# Patient Record
Sex: MALE | Race: WHITE | NOT HISPANIC OR LATINO | Employment: FULL TIME | URBAN - METROPOLITAN AREA
[De-identification: names, ages, dates, MRNs, and addresses within clinical notes are randomized per-mention and may not be internally consistent; named-entity substitution may affect disease eponyms.]

---

## 2018-01-31 ENCOUNTER — VIRTUAL VISIT (OUTPATIENT)
Dept: FAMILY MEDICINE | Facility: OTHER | Age: 58
End: 2018-01-31

## 2018-01-31 NOTE — PROGRESS NOTES
"Date:   Clinician: Ulises Kemp  Clinician NPI: [id number]  Patient: Marvin rojas  Patient : 1960  Patient Address: [address], [place] [address]  Patient Phone: [phone]  Visit Protocol: Eye conditions  Patient Summary:  Marvin is a 57 year old (: 1960 ) male who initiated a Visit for conjunctivitis.  When asked the question \"Please sign me up to receive news, health information and promotions from [employer].\", Marvin responded \"Yes\".    Images of his eye condition were uploaded.   His symptoms started 1-2 days ago and affect the left eye. The symptoms consist of itchy eye(s), eye redness, eyelid swelling, eye pain, and drainage coming from the eye(s).   Symptom details     Drainage: The color of the drainage coming out of his eye(s) is clear. The drainage is watery but does not cause his eyelids to be stuck shut in the morning.    Itchiness: Marvin does not have seasonal allergies or hay fever.    Eye pain: He is experiencing mild eye pain. He has not taken over-the-counter medication for the pain.     Denied symptoms include bumps on the eyelid and light sensitivity. Marvin has not experienced a decrease in vision and does not have subconjunctival hemorrhage. He does not feel feverish.    Marvin has not had a recent diagnosis of conjunctivitis. He also has not had a recent eye injury, foreign body in the eye(s), eye surgery, and cold or ear infection. It is not known if Marvin has recently been exposed to someone with a red eye or an eye infection. He does not wear contact lenses. Marvin has not ever been diagnosed with glaucoma and denies risk factors for glaucoma (not of  descent and no family history of glaucoma ).   Marvin is not taking medication to treat his current symptoms.   Marvin does not require proof of evaluation of his eye condition before returning to school, work, or .   Marvin does not smoke or use smokeless tobacco.   " MEDICATIONS:  No current medications  , ALLERGIES:  NKDA   Clinician Response:  Dear Marvin,  Based on the information provided, you most likely have bacterial conjunctivitis, more commonly called pink eye.  I am prescribing:  Polymyxin B-trimethoprim (Polytrim) 10,000 units-1mg 1ml ophthalmic solution. Apply 1 drop into the affected eye(s) every 3 hours, up to 6 times a day for 7 days.  The medication I prescribed is an antibiotic medication. Infections can be caused by either bacteria or a virus, and often have similar symptoms, so it is possible that this is a viral infection. Antibiotics are only effective against bacterial infections, so when it is caused by a virus, the medication will not help symptoms improve or make it less contagious.  To reduce the spread of the eye infection, be sure to wash your hands at least once per hour and avoid touching the eyes as much as possible.  The following will reduce the risk for future eye infections:     Frequent handwashing    Replace towels and washcloths daily    Do not share towels and washcloths with others     Glaucoma is a condition that involves increased pressure inside the eye most often seen in people who are over 50 years of age. Glaucoma usually does not have any noticeable symptoms until permanent vision loss has occurred. Fortunately, a simple test during an eye exam can detect glaucoma even before you have symptoms.  Because you are over the age of 50, I recommend you have an eye exam every 1-2 years to screen for this and other eye conditions even if you do not wear glasses.  Follow up with your provider if you are taking your medication as directed and do not see any improvements after 2 days. Be seen earlier if you develop any new or worsening symptoms.   Diagnosis: Bacterial conjunctivitis  Diagnosis ICD: H10.9  Prescription: polymyxin B/trimethoprim (Polytrim) 10,000 units-1mg 1ml ophthalmic solution 10 ml, 7 days supply. Apply 1 drop into the  affected eye(s) every 3 hours up to 6 times a day for 7 days. Refills: 0, Refill as needed: no, Allow substitutions: yes  Pharmacy: [id number] - [phone] - [address] [place] [address]

## 2018-10-24 ENCOUNTER — OFFICE VISIT (OUTPATIENT)
Dept: FAMILY MEDICINE | Facility: CLINIC | Age: 58
End: 2018-10-24
Payer: COMMERCIAL

## 2018-10-24 VITALS
TEMPERATURE: 97.2 F | HEART RATE: 80 BPM | RESPIRATION RATE: 16 BRPM | SYSTOLIC BLOOD PRESSURE: 134 MMHG | DIASTOLIC BLOOD PRESSURE: 82 MMHG | HEIGHT: 70 IN | BODY MASS INDEX: 32.87 KG/M2 | WEIGHT: 229.6 LBS

## 2018-10-24 DIAGNOSIS — R63.4 LOSS OF WEIGHT: ICD-10-CM

## 2018-10-24 DIAGNOSIS — E11.65 UNCONTROLLED TYPE 2 DIABETES MELLITUS WITH HYPERGLYCEMIA (H): Primary | ICD-10-CM

## 2018-10-24 DIAGNOSIS — R73.09 ELEVATED GLUCOSE: ICD-10-CM

## 2018-10-24 LAB
ALBUMIN SERPL-MCNC: 3.5 G/DL (ref 3.4–5)
ALP SERPL-CCNC: 47 U/L (ref 40–150)
ALT SERPL W P-5'-P-CCNC: 26 U/L (ref 0–70)
ANION GAP SERPL CALCULATED.3IONS-SCNC: 12 MMOL/L (ref 3–14)
AST SERPL W P-5'-P-CCNC: 14 U/L (ref 0–45)
BILIRUB SERPL-MCNC: 0.6 MG/DL (ref 0.2–1.3)
BUN SERPL-MCNC: 15 MG/DL (ref 7–30)
CALCIUM SERPL-MCNC: 9.1 MG/DL (ref 8.5–10.1)
CHLORIDE SERPL-SCNC: 98 MMOL/L (ref 94–109)
CO2 SERPL-SCNC: 22 MMOL/L (ref 20–32)
CREAT SERPL-MCNC: 0.63 MG/DL (ref 0.66–1.25)
ERYTHROCYTE [DISTWIDTH] IN BLOOD BY AUTOMATED COUNT: 12.1 % (ref 10–15)
GFR SERPL CREATININE-BSD FRML MDRD: >90 ML/MIN/1.7M2
GLUCOSE SERPL-MCNC: 551 MG/DL (ref 70–99)
HBA1C MFR BLD: >15 % (ref 0–5.6)
HCT VFR BLD AUTO: 45 % (ref 40–53)
HGB BLD-MCNC: 15.8 G/DL (ref 13.3–17.7)
MCH RBC QN AUTO: 29.5 PG (ref 26.5–33)
MCHC RBC AUTO-ENTMCNC: 35.1 G/DL (ref 31.5–36.5)
MCV RBC AUTO: 84 FL (ref 78–100)
PLATELET # BLD AUTO: 213 10E9/L (ref 150–450)
POTASSIUM SERPL-SCNC: 4.4 MMOL/L (ref 3.4–5.3)
PROT SERPL-MCNC: 7.4 G/DL (ref 6.8–8.8)
RBC # BLD AUTO: 5.36 10E12/L (ref 4.4–5.9)
SODIUM SERPL-SCNC: 132 MMOL/L (ref 133–144)
TSH SERPL DL<=0.005 MIU/L-ACNC: 1.15 MU/L (ref 0.4–4)
WBC # BLD AUTO: 7.2 10E9/L (ref 4–11)

## 2018-10-24 PROCEDURE — 99214 OFFICE O/P EST MOD 30 MIN: CPT | Performed by: FAMILY MEDICINE

## 2018-10-24 PROCEDURE — 84443 ASSAY THYROID STIM HORMONE: CPT | Performed by: FAMILY MEDICINE

## 2018-10-24 PROCEDURE — 83036 HEMOGLOBIN GLYCOSYLATED A1C: CPT | Performed by: FAMILY MEDICINE

## 2018-10-24 PROCEDURE — 80053 COMPREHEN METABOLIC PANEL: CPT | Performed by: FAMILY MEDICINE

## 2018-10-24 PROCEDURE — 36415 COLL VENOUS BLD VENIPUNCTURE: CPT | Performed by: FAMILY MEDICINE

## 2018-10-24 PROCEDURE — 85027 COMPLETE CBC AUTOMATED: CPT | Performed by: FAMILY MEDICINE

## 2018-10-24 RX ORDER — METFORMIN HCL 500 MG
500 TABLET, EXTENDED RELEASE 24 HR ORAL 2 TIMES DAILY WITH MEALS
Qty: 60 TABLET | Refills: 1 | Status: SHIPPED | OUTPATIENT
Start: 2018-10-24 | End: 2019-01-23

## 2018-10-24 ASSESSMENT — ENCOUNTER SYMPTOMS
DIARRHEA: 0
ABDOMINAL PAIN: 0
CHILLS: 1
EYE PAIN: 0
DIZZINESS: 1
HEMATOCHEZIA: 0
CONSTIPATION: 0
COUGH: 0
HEMATURIA: 0

## 2018-10-24 ASSESSMENT — PAIN SCALES - GENERAL: PAINLEVEL: NO PAIN (0)

## 2018-10-24 NOTE — PROGRESS NOTES
Physical   Annual:     Getting at least 3 servings of Calcium per day:  NO    Bi-annual eye exam:  NO    Dental care twice a year:  NO    Sleep apnea or symptoms of sleep apnea:  Sleep apnea    Diet:  Diabetic    Frequency of exercise:  None    Taking medications regularly:  Yes    Medication side effects:  Not applicable    Additional concerns today:  YES        Review of Systems   Constitutional: Positive for chills.   HENT: Negative for congestion and ear pain.    Eyes: Negative for pain.   Respiratory: Negative for cough.    Cardiovascular: Negative for chest pain.   Gastrointestinal: Negative for abdominal pain, constipation, diarrhea and hematochezia.   Genitourinary: Negative for hematuria.   Neurological: Positive for dizziness.       Physical Exam

## 2018-10-24 NOTE — PROGRESS NOTES
"S: Marvin Horvath is a 57 year old male scheduled as a physical, needed to make it a problem based visit.     Lost 100 pounds without trying    Hx DM2, hasn't been treating, no meds/insulin in past 2 years    Other than polydipsia and polyuria, feels fine      Problem list, med list, additional histories reviewed and updated, as indicated.      O: /82 (BP Location: Right arm, Patient Position: Chair, Cuff Size: Adult Regular)  Pulse 80  Temp 97.2  F (36.2  C) (Tympanic)  Resp 16  Ht 5' 10\" (1.778 m)  Wt 229 lb 9.6 oz (104.1 kg)  BMI 32.94 kg/m2  .GEN: Alert and oriented, in no acute distress  CV: RRR, no murmur  EXT: no edema or lesions noted in lower extremities    A1C: >15    A: severely uncontrolled diabetes type 2    P: he was  Told long acting insulin plus metformin is recommended given extent of A1C    He doesn't want shot.  \"I'm eating terribly, let me change that and get on metformin.\"  He knows he is at risk for serious illness with sugars that high    Will check sugars at home, get some new strips, get on metformin, change diet, back in 3-4 weeks with sugars from home.    TT 30 min ,more than 1/2 that time counseling on severe A1C elevation, risks of high sugars, and follow up plans.    "

## 2018-10-24 NOTE — MR AVS SNAPSHOT
After Visit Summary   10/24/2018    Mravin Horvath    MRN: [id number]           Patient Information     Date Of Birth          1960        Visit Information        Provider Department      10/24/2018 10:20 AM Zachary Andrews MD [facility]        Today's Diagnoses     Uncontrolled type 2 diabetes mellitus with hyperglycemia (H)    -  1    Loss of weight        Elevated glucose          Care Instructions      Preventive Health Recommendations  Male Ages 50 - 64    Yearly exam:             See your health care provider every year in order to  o   Review health changes.   o   Discuss preventive care.    o   Review your medicines if your doctor has prescribed any.     Have a cholesterol test every 5 years, or more frequently if you are at risk for high cholesterol/heart disease.     Have a diabetes test (fasting glucose) every three years. If you are at risk for diabetes, you should have this test more often.     Have a colonoscopy at age 50, or have a yearly FIT test (stool test). These exams will check for colon cancer.      Talk with your health care provider about whether or not a prostate cancer screening test (PSA) is right for you.    You should be tested each year for STDs (sexually transmitted diseases), if you re at risk.     Shots: Get a flu shot each year. Get a tetanus shot every 10 years.     Nutrition:    Eat at least 5 servings of fruits and vegetables daily.     Eat whole-grain bread, whole-wheat pasta and brown rice instead of white grains and rice.     Get adequate Calcium and Vitamin D.     Lifestyle    Exercise for at least 150 minutes a week (30 minutes a day, 5 days a week). This will help you control your weight and prevent disease.     Limit alcohol to one drink per day.     No smoking.     Wear sunscreen to prevent skin cancer.     See your dentist every six months for an exam and cleaning.     See your eye doctor every 1 to 2 years.            Follow-ups  "after your visit        Who to contact     If you have questions or need follow up information about today's clinic visit or your schedule please contact [facility] directly at [phone].  Normal or non-critical lab and imaging results will be communicated to you by MyChart, letter or phone within 4 business days after the clinic has received the results. If you do not hear from us within 7 days, please contact the clinic through MyChart or phone. If you have a critical or abnormal lab result, we will notify you by phone as soon as possible.  Submit refill requests through [employer] or call your pharmacy and they will forward the refill request to us. Please allow 3 business days for your refill to be completed.          Additional Information About Your Visit        Care EveryWhere ID     This is your Care EveryWhere ID. This could be used by other organizations to access your [facility] medical records  [id number]        Your Vitals Were     Pulse Temperature Respirations Height BMI (Body Mass Index)       80 97.2  F (36.2  C) (Tympanic) 16 5' 10\" (1.778 m) 32.94 kg/m2        Blood Pressure from Last 3 Encounters:   10/24/18 134/82    Weight from Last 3 Encounters:   10/24/18 229 lb 9.6 oz (104.1 kg)              We Performed the Following     CBC with platelets     Comprehensive metabolic panel (BMP + Alb, Alk Phos, ALT, AST, Total. Bili, TP)     Hemoglobin A1c     TSH with free T4 reflex          Today's Medication Changes          These changes are accurate as of 10/24/18 12:08 PM.  If you have any questions, ask your nurse or doctor.               Start taking these medicines.        Dose/Directions    blood glucose monitoring test strip   Commonly known as:  no brand specified   Used for:  Elevated glucose   Started by:  Zachary Andrews MD        Use to test blood sugars 1 times daily or as directed   Quantity:  100 strip   Refills:  1       metFORMIN 500 MG 24 hr tablet   Commonly known " as:  GLUCOPHAGE-XR   Used for:  Elevated glucose   Started by:  Zachary Andrews MD        Dose:  500 mg   Take 1 tablet (500 mg) by mouth 2 times daily (with meals)   Quantity:  60 tablet   Refills:  1            Where to get your medicines      These medications were [place] [facility] [place] - [address] [place] [address]  [address] [place] [address]     Phone:  [phone]     blood glucose monitoring test strip    metFORMIN 500 MG 24 hr tablet                Primary Care Provider Office Phone # Fax #    Zachary Andrews [phone] [address] [place] [id number]        Equal Access to Services     [facility]: Hadii aad ku hadasho Soomaali, waaxda luqadaha, qaybta kaalmada adeegyada, waxzabrina bonilla . So [facility] [phone].    ATENCIÓN: Si habla español, tiene a rod disposición servicios gratuitos de asistencia lingüística. Llame al [phone].    We comply with applicable federal civil rights laws and [place] laws. We do not discriminate on the basis of race, color, national origin, age, disability, sex, sexual orientation, or gender identity.            Thank you!     Thank you for choosing [facility]  for your care. Our goal is always to provide you with excellent care. Hearing back from our patients is one way we can continue to improve our services. Please take a few minutes to complete the written survey that you may receive in the mail after your visit with us. Thank you!             Your Updated Medication List - Protect others around you: Learn how to safely use, store and throw away your medicines at www.disposemymeds.org.          This list is accurate as of 10/24/18 12:08 PM.  Always use your most recent med list.                   Brand Name Dispense Instructions for use Diagnosis    blood glucose monitoring test strip    no brand specified    100 strip    Use to test blood sugars 1  times daily or as directed    Elevated glucose       metFORMIN 500 MG 24 hr tablet    GLUCOPHAGE-XR    60 tablet    Take 1 tablet (500 mg) by mouth 2 times daily (with meals)    Elevated glucose

## 2018-10-24 NOTE — NURSING NOTE
"Chief Complaint   Patient presents with     Physical       Initial /82 (BP Location: Right arm, Patient Position: Chair, Cuff Size: Adult Regular)  Pulse 80  Temp 97.2  F (36.2  C) (Tympanic)  Resp 16  Ht 5' 10\" (1.778 m)  Wt 229 lb 9.6 oz (104.1 kg)  BMI 32.94 kg/m2 Estimated body mass index is 32.94 kg/(m^2) as calculated from the following:    Height as of this encounter: 5' 10\" (1.778 m).    Weight as of this encounter: 229 lb 9.6 oz (104.1 kg).    Patient presents to the clinic using No DME    Health Maintenance that is potentially due pending provider review:  Colonoscopy - would prefer to do FIT testing.     Suzette Hector MA  10:33 AM 10/24/2018  .        "

## 2019-01-07 ENCOUNTER — TELEPHONE (OUTPATIENT)
Dept: FAMILY MEDICINE | Facility: CLINIC | Age: 59
End: 2019-01-07

## 2019-01-07 NOTE — TELEPHONE ENCOUNTER
Panel Management Review      Patient has the following on his problem list:     Diabetes    ASA: Failed    Last A1C  Lab Results   Component Value Date    A1C >15 10/24/2018     A1C tested: FAILED    Last LDL:    No results found for: CHOL  No results found for: HDL  No results found for: LDL  No results found for: TRIG  No results found for: CHOLHDLRATIO  No results found for: NHDL    Is the patient on a Statin? NO             Is the patient on Aspirin? NO        Last three blood pressure readings:  BP Readings from Last 3 Encounters:   10/24/18 134/82       Date of last diabetes office visit: 10/24/2018     Tobacco History:     History   Smoking Status     Never Smoker   Smokeless Tobacco     Never Used           Composite cancer screening  Chart review shows that this patient is due/due soon for the following Fecal Colorectal (FIT)  Summary:    Patient is due/failing the following:   Diabetic check    Action needed:   Patient needs office visit for diabetic check.    Type of outreach:    Phone, left message for patient to call back.     Questions for provider review:    None                                                                                                                                    Monica Morales, CMA

## 2019-01-23 ENCOUNTER — OFFICE VISIT (OUTPATIENT)
Dept: FAMILY MEDICINE | Facility: CLINIC | Age: 59
End: 2019-01-23
Payer: COMMERCIAL

## 2019-01-23 VITALS
BODY MASS INDEX: 35.65 KG/M2 | WEIGHT: 249 LBS | HEIGHT: 70 IN | OXYGEN SATURATION: 97 % | SYSTOLIC BLOOD PRESSURE: 138 MMHG | TEMPERATURE: 97.7 F | DIASTOLIC BLOOD PRESSURE: 80 MMHG | RESPIRATION RATE: 16 BRPM | HEART RATE: 83 BPM

## 2019-01-23 DIAGNOSIS — Z11.59 NEED FOR HEPATITIS C SCREENING TEST: ICD-10-CM

## 2019-01-23 DIAGNOSIS — E66.01 MORBID OBESITY (H): ICD-10-CM

## 2019-01-23 DIAGNOSIS — E11.40 TYPE 2 DIABETES MELLITUS WITH DIABETIC NEUROPATHY, WITHOUT LONG-TERM CURRENT USE OF INSULIN (H): Primary | ICD-10-CM

## 2019-01-23 DIAGNOSIS — E78.5 HYPERLIPIDEMIA LDL GOAL <100: ICD-10-CM

## 2019-01-23 DIAGNOSIS — Z11.4 ENCOUNTER FOR SCREENING FOR HUMAN IMMUNODEFICIENCY VIRUS (HIV): ICD-10-CM

## 2019-01-23 PROBLEM — E11.65 UNCONTROLLED TYPE 2 DIABETES MELLITUS WITH HYPERGLYCEMIA (H): Status: RESOLVED | Noted: 2018-10-24 | Resolved: 2019-01-23

## 2019-01-23 LAB
CHOLEST SERPL-MCNC: 219 MG/DL
CREAT UR-MCNC: 75 MG/DL
HBA1C MFR BLD: 8.9 % (ref 0–5.6)
HCV AB SERPL QL IA: NONREACTIVE
HDLC SERPL-MCNC: 32 MG/DL
HIV 1+2 AB+HIV1 P24 AG SERPL QL IA: NONREACTIVE
LDLC SERPL CALC-MCNC: 134 MG/DL
MICROALBUMIN UR-MCNC: 158 MG/L
MICROALBUMIN/CREAT UR: 209.83 MG/G CR (ref 0–17)
NONHDLC SERPL-MCNC: 187 MG/DL
TRIGL SERPL-MCNC: 267 MG/DL

## 2019-01-23 PROCEDURE — 99207 C FOOT EXAM  NO CHARGE: CPT | Performed by: FAMILY MEDICINE

## 2019-01-23 PROCEDURE — 83036 HEMOGLOBIN GLYCOSYLATED A1C: CPT | Performed by: FAMILY MEDICINE

## 2019-01-23 PROCEDURE — 80061 LIPID PANEL: CPT | Performed by: FAMILY MEDICINE

## 2019-01-23 PROCEDURE — 82043 UR ALBUMIN QUANTITATIVE: CPT | Performed by: FAMILY MEDICINE

## 2019-01-23 PROCEDURE — 86803 HEPATITIS C AB TEST: CPT | Performed by: FAMILY MEDICINE

## 2019-01-23 PROCEDURE — 36415 COLL VENOUS BLD VENIPUNCTURE: CPT | Performed by: FAMILY MEDICINE

## 2019-01-23 PROCEDURE — 87389 HIV-1 AG W/HIV-1&-2 AB AG IA: CPT | Performed by: FAMILY MEDICINE

## 2019-01-23 PROCEDURE — 99214 OFFICE O/P EST MOD 30 MIN: CPT | Performed by: FAMILY MEDICINE

## 2019-01-23 RX ORDER — ATORVASTATIN CALCIUM 20 MG/1
20 TABLET, FILM COATED ORAL DAILY
Qty: 90 TABLET | Refills: 3 | Status: SHIPPED | OUTPATIENT
Start: 2019-01-23 | End: 2019-05-01

## 2019-01-23 RX ORDER — GLIPIZIDE 10 MG/1
10 TABLET, FILM COATED, EXTENDED RELEASE ORAL DAILY
Qty: 90 TABLET | Refills: 3 | Status: SHIPPED | OUTPATIENT
Start: 2019-01-23 | End: 2019-05-01

## 2019-01-23 ASSESSMENT — MIFFLIN-ST. JEOR: SCORE: 1965.71

## 2019-01-23 NOTE — PROGRESS NOTES
"  SUBJECTIVE:   Marvin Horvath is a 58 year old male who presents to clinic today for the following health issues:      Diabetes Follow-up    Patient is checking blood sugars: once daily.  Results are as follows:         am - 170-220    Diabetic concerns: other - 2 weeks ago was perspiring the whole night.     Symptoms of hypoglycemia (low blood sugar): none     Paresthesias (numbness or burning in feet) or sores: Yes burning at night.  Since starting Metformin     Date of last diabetic eye exam: Due    BP Readings from Last 2 Encounters:   10/24/18 134/82     Hemoglobin A1C (%)   Date Value   10/24/2018 >15       Diabetes Management Resources    * Has a burning sensation in his outer right thigh, from his knee to his hip    S :Marvin Horvath is a 58 year old male with diabetes 2.  Some numbness in leg, feet.  a1c was over 15, now down to 8.9 with diet and metformin.  Working hard.  Willing to add more meds    Hyperlipidemia: willing to add statin    Morbid obesity: 35 BMI with DM2    No cp or sob    Problem list, med list, additional histories reviewed and updated, as indicated.          /80 (BP Location: Right arm, Patient Position: Chair, Cuff Size: Adult Large)   Pulse 83   Temp 97.7  F (36.5  C) (Tympanic)   Resp 16   Ht 1.778 m (5' 10\")   Wt 112.9 kg (249 lb)   SpO2 97%   BMI 35.73 kg/m      GEN: Alert and oriented, in no acute distress  CV: RRR, no murmur  EXT: no edema or lesions noted in lower extremities        A: DM2 with neuropathy, improved       Hyperlipidemia, adding statin        Morbid obesity, improving    P:  Up to 1000mg bid on metformin.  Add glipizide.  Add statin.  Add asa.  Continue to work on wt loss.     Back in 4 months.  Great improvement, see if it can keep going in right direction.     Weight management plan: diet/exercise     "

## 2019-01-23 NOTE — NURSING NOTE
"Chief Complaint   Patient presents with     Diabetes       Initial /80 (BP Location: Right arm, Patient Position: Chair, Cuff Size: Adult Large)   Pulse 83   Temp 97.7  F (36.5  C) (Tympanic)   Resp 16   Ht 1.778 m (5' 10\")   Wt 112.9 kg (249 lb)   SpO2 97%   BMI 35.73 kg/m   Estimated body mass index is 35.73 kg/m  as calculated from the following:    Height as of this encounter: 1.778 m (5' 10\").    Weight as of this encounter: 112.9 kg (249 lb).    Patient presents to the clinic using No DME    Health Maintenance that is potentially due pending provider review:  NONE        Is there anyone who you would like to be able to receive your results? No  If yes have patient fill out KALLIE      "

## 2019-01-23 NOTE — LETTER
January 28, 2019      Marvin Horvath  [address] [facility] [address]        Dear ,    We are writing to inform you of your test results.    Here are the rest of your labs.  You had a little protein in the urine, hopefully that goes down as your diabetes continues to improve.     Resulted Orders   Lipid panel reflex to direct LDL Fasting   Result Value Ref Range    Cholesterol 219 (H) <200 mg/dL      Comment:      Desirable:       <200 mg/dl    Triglycerides 267 (H) <150 mg/dL      Comment:      Borderline high:  150-199 mg/dl  High:             200-499 mg/dl  Very high:       >499 mg/dl      HDL Cholesterol 32 (L) >39 mg/dL    LDL Cholesterol Calculated 134 (H) <100 mg/dL      Comment:      Above desirable:  100-129 mg/dl  Borderline High:  130-159 mg/dL  High:             160-189 mg/dL  Very high:       >189 mg/dl      Non HDL Cholesterol 187 (H) <130 mg/dL      Comment:      Above Desirable:  130-159 mg/dl  Borderline high:  160-189 mg/dl  High:             190-219 mg/dl  Very high:       >219 mg/dl     Albumin Random Urine Quantitative with Creat Ratio   Result Value Ref Range    Creatinine Urine 75 mg/dL    Albumin Urine mg/L 158 mg/L    Albumin Urine mg/g Cr 209.83 (H) 0 - 17 mg/g Cr   Hepatitis C Screen Reflex to HCV RNA Quant and Genotype   Result Value Ref Range    Hepatitis C Antibody Nonreactive NR^Nonreactive      Comment:      Assay performance characteristics have not been established for newborns,   infants, and children     HIV Antigen Antibody Combo   Result Value Ref Range    HIV Antigen Antibody Combo Nonreactive NR^Nonreactive          Comment:      HIV-1 p24 Ag & HIV-1/HIV-2 Ab Not Detected   Hemoglobin A1c   Result Value Ref Range    Hemoglobin A1C 8.9 (H) 0 - 5.6 %      Comment:      Normal <5.7% Prediabetes 5.7-6.4%  Diabetes 6.5% or higher - adopted from ADA   consensus guidelines.         If you have any questions or concerns, please call the clinic at the number listed  above.       Sincerely,        Zachary Andrews MD/aman

## 2019-05-01 ENCOUNTER — OFFICE VISIT (OUTPATIENT)
Dept: FAMILY MEDICINE | Facility: CLINIC | Age: 59
End: 2019-05-01
Payer: COMMERCIAL

## 2019-05-01 VITALS
WEIGHT: 241 LBS | TEMPERATURE: 96.3 F | HEART RATE: 88 BPM | DIASTOLIC BLOOD PRESSURE: 85 MMHG | SYSTOLIC BLOOD PRESSURE: 130 MMHG | BODY MASS INDEX: 34.5 KG/M2 | HEIGHT: 70 IN | RESPIRATION RATE: 16 BRPM

## 2019-05-01 DIAGNOSIS — E11.40 TYPE 2 DIABETES MELLITUS WITH DIABETIC NEUROPATHY, WITHOUT LONG-TERM CURRENT USE OF INSULIN (H): ICD-10-CM

## 2019-05-01 DIAGNOSIS — G47.30 SLEEP APNEA, UNSPECIFIED TYPE: Primary | ICD-10-CM

## 2019-05-01 PROBLEM — E66.01 MORBID OBESITY (H): Status: RESOLVED | Noted: 2019-01-23 | Resolved: 2019-05-01

## 2019-05-01 LAB — HBA1C MFR BLD: 5.5 % (ref 0–5.6)

## 2019-05-01 PROCEDURE — 83036 HEMOGLOBIN GLYCOSYLATED A1C: CPT | Performed by: FAMILY MEDICINE

## 2019-05-01 PROCEDURE — 36415 COLL VENOUS BLD VENIPUNCTURE: CPT | Performed by: FAMILY MEDICINE

## 2019-05-01 PROCEDURE — 99214 OFFICE O/P EST MOD 30 MIN: CPT | Performed by: FAMILY MEDICINE

## 2019-05-01 RX ORDER — ATORVASTATIN CALCIUM 20 MG/1
20 TABLET, FILM COATED ORAL DAILY
Qty: 90 TABLET | Refills: 2 | COMMUNITY
Start: 2019-05-01 | End: 2019-11-13

## 2019-05-01 ASSESSMENT — MIFFLIN-ST. JEOR: SCORE: 1929.42

## 2019-05-01 NOTE — PROGRESS NOTES
"  SUBJECTIVE:   Marvin Horvath is a 58 year old male who presents to clinic today for the following   health issues:      1.) Pain  - having pain all over from top of his to his feet.   - from the hips down, pain is worse  - feels like \"little pins\"   - Stopped Lipitor and Glipizide, was reading side effects of joint pain. Helped some.       S: Marvin Horvath is a 58 year old male with some neuropathy from his diabetes.  Severely uncontrolled, then got it down under 10 on A1C with diet/metformin.  Had aches on lipitor and glipizide, stopped both.  Needs recheck A1C.       Hyperlipidemia: willing to try 1/2 dose statin, thinks he may have had aches on full dose    Problem list, med list, additional histories reviewed and updated, as indicated.      No cp or sob    O:/85 (BP Location: Right arm, Patient Position: Chair, Cuff Size: Adult Large)   Pulse 88   Temp 96.3  F (35.7  C) (Tympanic)   Resp 16   Ht 1.778 m (5' 10\")   Wt 109.3 kg (241 lb)   BMI 34.58 kg/m    GEN: Alert and oriented, in no acute distress  CV: RRR, no murmur  RESP: lungs clear bilaterally, good effort  EXT: no edema or lesions noted in lower extremities    A: diabetes 2 with neuropathy      Hyperlipidemia, off statin    P: A1C 5.5!!.  Stop glipizide.  Stay on metformin.  Could have been getting low BS on glipizide.  Back in 4 months.    1/2 dose lipitor, see how he does.  Titrate in future.      Weight management plan: diet/exercise     "

## 2019-05-01 NOTE — NURSING NOTE
"Chief Complaint   Patient presents with     Musculoskeletal Problem     leg pain       Initial /85 (BP Location: Right arm, Patient Position: Chair, Cuff Size: Adult Large)   Pulse 88   Temp 96.3  F (35.7  C) (Tympanic)   Resp 16   Ht 1.778 m (5' 10\")   Wt 109.3 kg (241 lb)   BMI 34.58 kg/m   Estimated body mass index is 34.58 kg/m  as calculated from the following:    Height as of this encounter: 1.778 m (5' 10\").    Weight as of this encounter: 109.3 kg (241 lb).    Patient presents to the clinic using No DME    Health Maintenance that is potentially due pending provider review:  Colonoscopy - pt informed will schedule    Suzette Hector MA  7:52 AM 5/1/2019  .      "

## 2019-06-01 ENCOUNTER — TELEPHONE (OUTPATIENT)
Dept: FAMILY MEDICINE | Facility: CLINIC | Age: 59
End: 2019-06-01

## 2019-06-01 NOTE — TELEPHONE ENCOUNTER
Panel Management Review      Composite cancer screening  Chart review shows that this patient is due/due soon for the following Colonoscopy  Summary:    Patient is due/failing the following:   COLONOSCOPY    Action needed:   Patient needs to complete a colonoscopy or fit test. Patient needs an order for colonoscopy or fit test if patient agrees to do either.     Type of outreach:    Phone, left message for patient to call back.     Questions for provider review:    None                                                                                                                                    Vivian Adames, cma

## 2019-11-13 ENCOUNTER — ANCILLARY PROCEDURE (OUTPATIENT)
Dept: GENERAL RADIOLOGY | Facility: CLINIC | Age: 59
End: 2019-11-13
Attending: FAMILY MEDICINE
Payer: COMMERCIAL

## 2019-11-13 ENCOUNTER — OFFICE VISIT (OUTPATIENT)
Dept: FAMILY MEDICINE | Facility: CLINIC | Age: 59
End: 2019-11-13
Payer: COMMERCIAL

## 2019-11-13 VITALS
TEMPERATURE: 98.1 F | HEIGHT: 70 IN | HEART RATE: 82 BPM | DIASTOLIC BLOOD PRESSURE: 84 MMHG | BODY MASS INDEX: 37.37 KG/M2 | OXYGEN SATURATION: 100 % | SYSTOLIC BLOOD PRESSURE: 138 MMHG | WEIGHT: 261 LBS | RESPIRATION RATE: 18 BRPM

## 2019-11-13 DIAGNOSIS — Z83.2 FH: FACTOR V LEIDEN MUTATION: ICD-10-CM

## 2019-11-13 DIAGNOSIS — K21.9 GASTROESOPHAGEAL REFLUX DISEASE, ESOPHAGITIS PRESENCE NOT SPECIFIED: ICD-10-CM

## 2019-11-13 DIAGNOSIS — E11.40 TYPE 2 DIABETES MELLITUS WITH DIABETIC NEUROPATHY, WITHOUT LONG-TERM CURRENT USE OF INSULIN (H): Primary | ICD-10-CM

## 2019-11-13 DIAGNOSIS — R05.9 COUGH: ICD-10-CM

## 2019-11-13 LAB
ANION GAP SERPL CALCULATED.3IONS-SCNC: 4 MMOL/L (ref 3–14)
BUN SERPL-MCNC: 12 MG/DL (ref 7–30)
CALCIUM SERPL-MCNC: 9.3 MG/DL (ref 8.5–10.1)
CHLORIDE SERPL-SCNC: 101 MMOL/L (ref 94–109)
CO2 SERPL-SCNC: 28 MMOL/L (ref 20–32)
CREAT SERPL-MCNC: 0.78 MG/DL (ref 0.66–1.25)
GFR SERPL CREATININE-BSD FRML MDRD: >90 ML/MIN/{1.73_M2}
GLUCOSE SERPL-MCNC: 344 MG/DL (ref 70–99)
HBA1C MFR BLD: 8.5 % (ref 0–5.6)
POTASSIUM SERPL-SCNC: 4.5 MMOL/L (ref 3.4–5.3)
SODIUM SERPL-SCNC: 133 MMOL/L (ref 133–144)

## 2019-11-13 PROCEDURE — 80048 BASIC METABOLIC PNL TOTAL CA: CPT | Performed by: FAMILY MEDICINE

## 2019-11-13 PROCEDURE — 99214 OFFICE O/P EST MOD 30 MIN: CPT | Performed by: FAMILY MEDICINE

## 2019-11-13 PROCEDURE — 83036 HEMOGLOBIN GLYCOSYLATED A1C: CPT | Performed by: FAMILY MEDICINE

## 2019-11-13 PROCEDURE — 36415 COLL VENOUS BLD VENIPUNCTURE: CPT | Performed by: FAMILY MEDICINE

## 2019-11-13 PROCEDURE — 81241 F5 GENE: CPT | Performed by: FAMILY MEDICINE

## 2019-11-13 PROCEDURE — 71046 X-RAY EXAM CHEST 2 VIEWS: CPT

## 2019-11-13 RX ORDER — PRAVASTATIN SODIUM 20 MG
20 TABLET ORAL DAILY
Qty: 90 TABLET | Refills: 3 | Status: ON HOLD | OUTPATIENT
Start: 2019-11-13 | End: 2021-08-14

## 2019-11-13 ASSESSMENT — MIFFLIN-ST. JEOR: SCORE: 2016.17

## 2019-11-13 NOTE — LETTER
November 21, 2019      Marvin Horvath  [address] [facility] [address]        Dear ,    We have been unable to reach you by telephone after several attempts.     We are writing to inform you of your test results.    Your test results show that you do not have that mutation in your blood clotting that your dad does.    This is good news.       If you have any questions or concerns, please call the clinic at the number listed above.       Sincerely,        Zachary Andrews MD

## 2019-11-13 NOTE — PROGRESS NOTES
"Chief Complaint   Patient presents with     Cough     cough x months and worsening     Perspiration     night sweats for months     Musculoskeletal Problem     achey legs and feels legs are weak     S: Marvin Horvath is a 58 year old male with DM2.  Likely worse again, not following diet, was above 10 on A1C before, got down to 5.5, now wonders where he is.     Cough: gerd, spasmotic chart.       Tired at times, some achiness.  Worse with sugars being back up    Dad with clots, factor V homozygous.  Family getting tested, Marvin wants testing    Hyperlipidemia; not on lipitor.  Feels made aches worse.  Wants to try another one if he has to be on cholesterol med    Problem list, med list, additional histories reviewed and updated, as indicated.      No cp or sob or rash or LE edema.  No fevers.      O:/84 (BP Location: Right arm, Patient Position: Sitting, Cuff Size: Adult Large)   Pulse 82   Temp 98.1  F (36.7  C) (Tympanic)   Resp 18   Ht 1.772 m (5' 9.75\")   Wt 118.4 kg (261 lb)   SpO2 100%   BMI 37.72 kg/m    GEN: Alert and oriented, in no acute distress  CV: RRR, no murmur  RESP: lungs clear bilaterally, good effort  Neck: neck supple without mass or lymphadenopathy  ENT: oropharynx clear, no exudate or palate/tonsil asymmetry  EXT: no edema or lesions noted in lower extremities    Cxr: looks normal     A: DM2, not controlled       Hyperlipidemia, off statin       FH factor V, check lab       Cough, nos      GERD, cough cause?         P:  Refocus on DM2.  Pravastatin 20mg.  PPI for 3 months.  Back in 3 months.  See how cough, DM doing.      He agrees.    Weight management plan: diet/exericse       "

## 2019-11-18 LAB — COPATH REPORT: NORMAL

## 2020-02-23 ENCOUNTER — HEALTH MAINTENANCE LETTER (OUTPATIENT)
Age: 60
End: 2020-02-23

## 2020-04-06 ENCOUNTER — OFFICE VISIT (OUTPATIENT)
Dept: FAMILY MEDICINE | Facility: CLINIC | Age: 60
End: 2020-04-06
Payer: COMMERCIAL

## 2020-04-06 VITALS
TEMPERATURE: 97 F | WEIGHT: 227 LBS | RESPIRATION RATE: 18 BRPM | HEART RATE: 86 BPM | SYSTOLIC BLOOD PRESSURE: 128 MMHG | BODY MASS INDEX: 32.5 KG/M2 | OXYGEN SATURATION: 98 % | DIASTOLIC BLOOD PRESSURE: 74 MMHG | HEIGHT: 70 IN

## 2020-04-06 DIAGNOSIS — E11.40 TYPE 2 DIABETES MELLITUS WITH DIABETIC NEUROPATHY, WITHOUT LONG-TERM CURRENT USE OF INSULIN (H): ICD-10-CM

## 2020-04-06 DIAGNOSIS — Z90.49 S/P APPENDECTOMY: Primary | ICD-10-CM

## 2020-04-06 LAB
ANION GAP SERPL CALCULATED.3IONS-SCNC: 7 MMOL/L (ref 3–14)
BUN SERPL-MCNC: 10 MG/DL (ref 7–30)
CALCIUM SERPL-MCNC: 9.1 MG/DL (ref 8.5–10.1)
CHLORIDE SERPL-SCNC: 100 MMOL/L (ref 94–109)
CO2 SERPL-SCNC: 26 MMOL/L (ref 20–32)
CREAT SERPL-MCNC: 0.67 MG/DL (ref 0.66–1.25)
GFR SERPL CREATININE-BSD FRML MDRD: >90 ML/MIN/{1.73_M2}
GLUCOSE SERPL-MCNC: 269 MG/DL (ref 70–99)
POTASSIUM SERPL-SCNC: 4.2 MMOL/L (ref 3.4–5.3)
SODIUM SERPL-SCNC: 133 MMOL/L (ref 133–144)

## 2020-04-06 PROCEDURE — 99214 OFFICE O/P EST MOD 30 MIN: CPT | Performed by: FAMILY MEDICINE

## 2020-04-06 PROCEDURE — 36415 COLL VENOUS BLD VENIPUNCTURE: CPT | Performed by: FAMILY MEDICINE

## 2020-04-06 PROCEDURE — 80048 BASIC METABOLIC PNL TOTAL CA: CPT | Performed by: FAMILY MEDICINE

## 2020-04-06 RX ORDER — GLIPIZIDE 5 MG/1
5 TABLET, FILM COATED, EXTENDED RELEASE ORAL DAILY
Qty: 90 TABLET | Refills: 1 | Status: SHIPPED | OUTPATIENT
Start: 2020-04-06 | End: 2020-04-15

## 2020-04-06 RX ORDER — ONDANSETRON 4 MG/1
4 TABLET, ORALLY DISINTEGRATING ORAL EVERY 8 HOURS PRN
Qty: 30 TABLET | Refills: 1 | Status: ON HOLD | OUTPATIENT
Start: 2020-04-06 | End: 2021-08-14

## 2020-04-06 RX ORDER — METRONIDAZOLE 500 MG/1
500 TABLET ORAL
COMMUNITY
Start: 2020-03-31 | End: 2020-04-10

## 2020-04-06 RX ORDER — CIPROFLOXACIN 500 MG/1
500 TABLET, FILM COATED ORAL
COMMUNITY
Start: 2020-03-31 | End: 2020-04-10

## 2020-04-06 ASSESSMENT — MIFFLIN-ST. JEOR: SCORE: 1856.95

## 2020-04-06 NOTE — LETTER
April 13, 2020      Marvin Horvath  [address] [facility] [address]        Dear ,    We are writing to inform you of your test results.    Random blood glucose 269, above reference range of less than 160.  Start glipizide and continue metformin as discussed earlier.  Follow through with diabetic educator as well.    Resulted Orders   Basic metabolic panel  (Ca, Cl, CO2, Creat, Gluc, K, Na, BUN)   Result Value Ref Range    Sodium 133 133 - 144 mmol/L    Potassium 4.2 3.4 - 5.3 mmol/L    Chloride 100 94 - 109 mmol/L    Carbon Dioxide 26 20 - 32 mmol/L    Anion Gap 7 3 - 14 mmol/L    Glucose 269 (H) 70 - 99 mg/dL      Comment:      Non Fasting    Urea Nitrogen 10 7 - 30 mg/dL    Creatinine 0.67 0.66 - 1.25 mg/dL    GFR Estimate >90 >60 mL/min/[1.73_m2]      Comment:      Non  GFR Calc  Starting 12/18/2018, serum creatinine based estimated GFR (eGFR) will be   calculated using the Chronic Kidney Disease Epidemiology Collaboration   (CKD-EPI) equation.      GFR Estimate If Black >90 >60 mL/min/[1.73_m2]      Comment:       GFR Calc  Starting 12/18/2018, serum creatinine based estimated GFR (eGFR) will be   calculated using the Chronic Kidney Disease Epidemiology Collaboration   (CKD-EPI) equation.      Calcium 9.1 8.5 - 10.1 mg/dL       If you have any questions or concerns, please call the clinic at the number listed above.       Sincerely,        Olegario Urrutia MD/bay

## 2020-04-06 NOTE — NURSING NOTE
"Chief Complaint   Patient presents with     Hospital F/U     /74 (Cuff Size: Adult Regular)   Pulse 86   Temp 97  F (36.1  C) (Tympanic)   Resp 18   Ht 1.772 m (5' 9.75\")   Wt 103 kg (227 lb)   SpO2 98%   BMI 32.81 kg/m   Estimated body mass index is 32.81 kg/m  as calculated from the following:    Height as of this encounter: 1.772 m (5' 9.75\").    Weight as of this encounter: 103 kg (227 lb).  Patient presents to the clinic using No DME      Health Maintenance that is potentially due pending provider review:    Health Maintenance Due   Topic Date Due     PREVENTIVE CARE VISIT  1960     ADVANCE CARE PLANNING  1960     EYE EXAM  1960     COLORECTAL CANCER SCREENING  12/27/1972     PNEUMOCOCCAL IMMUNIZATION 19-64 MEDIUM RISK (1 of 1 - PPSV23) 12/27/1981     ZOSTER IMMUNIZATION (1 of 2) 12/27/2012     INFLUENZA VACCINE (1) 09/01/2019     PHQ-2  01/01/2020     LIPID  01/23/2020     MICROALBUMIN  01/23/2020     DIABETIC FOOT EXAM  01/23/2020     A1C  02/13/2020        Possibly completing today per provider review.        "

## 2020-04-06 NOTE — PATIENT INSTRUCTIONS
Patient Education     Diet: Diabetes  Food is an important tool that you can use to control diabetes and stay healthy. Eating well-balanced meals in the correct amounts will help you control your blood glucose levels and prevent low blood sugar reactions. It will also help you reduce the health risks of diabetes. There is no one specific diet that is right for everyone with diabetes. But there are general guidelines to follow. A registered dietitian (RD) will create a tailored diet approach that s just right for you. He or she will also help you plan healthy meals and snacks. If you have any questions, call your dietitian for advice.     Guidelines for success  Talk with your healthcare provider before starting a diabetes diet or weight loss program. If you haven't talked with a dietitian yet, ask your provider for a referral. The following guidelines can help you succeed:    Select foods from the 6 food groups below. Your dietitian will help you find food choices within each group. He or she will also show you serving sizes and how many servings you can have at each meal.  ? Grains, beans, and starchy vegetables  ? Vegetables  ? Fruit  ? Milk or yogurt  ? Meat, poultry, fish, or tofu  ? Healthy fats    Check your blood sugar levels as directed by your provider. Take any medicine as prescribed by your provider.    Learn to read food labels and pick the right portion sizes.    Eat only the amount of food in your meal plan. Eat about the same amount of food at regular times each day. Don t skip meals. Eat meals 4 to 5 hours apart, with snacks in between.    Limit alcohol. It raises blood sugar levels. Drink water or calorie-free diet drinks that use safe sweeteners.    Eat less fat to help lower your risk of heart disease. Use nonfat or low-fat dairy products and lean meats. Avoid fried foods. Use cooking oils that are unsaturated, such as olive, canola, or peanut oil.    Talk with your dietitian about safe sugar  substitutes.    Avoid added salt. It can contribute to high blood pressure, which can cause heart disease. People with diabetes already have a risk of high blood pressure and heart disease.    Stay at a healthy weight. If you need to lose weight, cut down on your portion sizes. But don t skip meals. Exercise is an important part of any weight management program. Talk with your provider about an exercise program that s right for you.    For more information about the best diet plan for you, talk with a registered dietitian (RD). To find an RD in your area, contact:  ? Academy of Nutrition and Dietetics www.eatright.org  ? The American Diabetes Association [phone] www.diabetes.org  Date Last Reviewed: 8/1/2016 2000-2019 [employer]. [address] [place] [address], [place] [address]. All rights reserved. This information is not intended as a substitute for professional medical care. Always follow your healthcare professional's instructions.

## 2020-04-06 NOTE — PROGRESS NOTES
SUBJECTIVE   Marvin Horvath is a 59 year old male who presents with     Hospital Follow-up Visit:    Hospital/Nursing Home/IP Rehab Facility: [facility]  Date of Admission: 3/30/2020  Date of Discharge: 4/1/2020  Reason(s) for Admission: Acute appendicitis with localized peritonitis,  Diabetic ketoacidosis without coma  Appendicitis with perforation    Nausea isn't getting better. Its making it very hard to eat anything. Spitting up clear phlegm   Blood sugars have been running 235-290               Problems taking medications regularly:  None       Medication changes since discharge: None       Problems adhering to non-medication therapy:  None    Summary of hospitalization:  [facility] discharge summary reviewed  Diagnostic Tests/Treatments reviewed.  Follow up needed: none  Other Healthcare Providers Involved in Patient s Care:         None  Update since discharge: improved.     Post Discharge Medication Reconciliation: discharge medications reconciled and changed, per note/orders (see AVS).  Plan of care communicated with patient     Coding guidelines for this visit:  Type of Medical   Decision Making Face-to-Face Visit       within 7 Days of discharge Face-to-Face Visit        within 14 days of discharge   Moderate Complexity [id number]   High Complexity [id number]              PCP   Zachary Andrews [phone]    Health Maintenance        Health Maintenance Due   Topic Date Due     PREVENTIVE CARE VISIT  1960     ADVANCE CARE PLANNING  1960     EYE EXAM  1960     COLORECTAL CANCER SCREENING  12/27/1972     PNEUMOCOCCAL IMMUNIZATION 19-64 MEDIUM RISK (1 of 1 - PPSV23) 12/27/1981     ZOSTER IMMUNIZATION (1 of 2) 12/27/2012     INFLUENZA VACCINE (1) 09/01/2019     PHQ-2  01/01/2020     LIPID  01/23/2020     MICROALBUMIN  01/23/2020     DIABETIC FOOT EXAM  01/23/2020     A1C  02/13/2020       HPI        Patient Active Problem List   Diagnosis     Type 2 diabetes mellitus  with diabetic neuropathy (H)     Hyperlipidemia LDL goal <100     Gastroesophageal reflux disease, esophagitis presence not specified     Current Outpatient Medications   Medication     blood glucose monitoring (NO BRAND SPECIFIED) test strip     ciprofloxacin (CIPRO) 500 MG tablet     metFORMIN (GLUCOPHAGE) 1000 MG tablet     metroNIDAZOLE (FLAGYL) 500 MG tablet     aspirin (ASA) 81 MG tablet     omeprazole (PRILOSEC) 20 MG DR capsule     pravastatin (PRAVACHOL) 20 MG tablet     No current facility-administered medications for this visit.        Patient Active Problem List   Diagnosis     Type 2 diabetes mellitus with diabetic neuropathy (H)     Hyperlipidemia LDL goal <100     Gastroesophageal reflux disease, esophagitis presence not specified     No past surgical history on file.    Social History     Tobacco Use     Smoking status: Never Smoker     Smokeless tobacco: Never Used   Substance Use Topics     Alcohol use: No     No family history on file.      Current Outpatient Medications   Medication Sig Dispense Refill     blood glucose monitoring (NO BRAND SPECIFIED) test strip Use to test blood sugars 1 times daily or as directed 100 strip 1     ciprofloxacin (CIPRO) 500 MG tablet Take 500 mg by mouth       glipiZIDE (GLUCOTROL XL) 5 MG 24 hr tablet Take 1 tablet (5 mg) by mouth daily 90 tablet 1     metFORMIN (GLUCOPHAGE) 1000 MG tablet Take 1 tablet (1,000 mg) by mouth 2 times daily (with meals) 180 tablet 3     metroNIDAZOLE (FLAGYL) 500 MG tablet Take 500 mg by mouth       ondansetron (ZOFRAN-ODT) 4 MG ODT tab Take 1 tablet (4 mg) by mouth every 8 hours as needed for nausea 30 tablet 1     aspirin (ASA) 81 MG tablet Take 1 tablet (81 mg) by mouth daily 90 tablet 3     omeprazole (PRILOSEC) 20 MG DR capsule Take 1 capsule (20 mg) by mouth daily (Patient not taking: Reported on 4/6/2020) 90 capsule 1     pravastatin (PRAVACHOL) 20 MG tablet Take 1 tablet (20 mg) by mouth daily (Patient not taking: Reported on  "4/6/2020) 90 tablet 3     No Known Allergies  Recent Labs   Lab Test 11/13/19  0827 05/01/19  0814 01/23/19  0849 10/24/18  1045   A1C 8.5* 5.5 8.9* >15   LDL  --   --  134*  --    HDL  --   --  32*  --    TRIG  --   --  267*  --    ALT  --   --   --  26   CR 0.78  --   --  0.63*   GFRESTIMATED >90  --   --  >90   GFRESTBLACK >90  --   --  >90   POTASSIUM 4.5  --   --  4.4   TSH  --   --   --  1.15      BP Readings from Last 3 Encounters:   04/06/20 128/74   11/13/19 138/84   05/01/19 130/85    Wt Readings from Last 3 Encounters:   04/06/20 103 kg (227 lb)   11/13/19 118.4 kg (261 lb)   05/01/19 109.3 kg (241 lb)                    Reviewed and updated:  Tobacco  Allergies  Meds  Med Hx  Surg Hx  Fam Hx  Soc Hx     ROS:  Constitutional, neuro, ENT, endocrine, pulmonary, cardiac, gastrointestinal, genitourinary, musculoskeletal, integument and psychiatric systems are negative, except as otherwise noted.    PHYSICAL EXAM   /74 (Cuff Size: Adult Regular)   Pulse 86   Temp 97  F (36.1  C) (Tympanic)   Resp 18   Ht 1.772 m (5' 9.75\")   Wt 103 kg (227 lb)   SpO2 98%   BMI 32.81 kg/m    Body mass index is 32.81 kg/m .  GENERAL: alert and no distress  EYES: Eyes grossly normal to inspection, PERRL and conjunctivae and sclerae normal  NECK: no adenopathy, no asymmetry, masses, or scars and thyroid normal to palpation  RESP: lungs clear to auscultation - no rales, rhonchi or wheezes  CV: regular rates and rhythm, normal S1 S2, no S3 or S4 and no murmur, click or rub  ABDOMEN: soft, nontender and surgical scars C/D/I  MS: no gross musculoskeletal defects noted, no edema  SKIN: no suspicious lesions or rashes  NEURO: Normal strength and tone, mentation intact and speech normal    Assessment & Plan     (Z90.49) S/P appendectomy  (primary encounter diagnosis)  Comment: Underwent laparoscopic appendectomy on March 30, 2020.  Appetite is improving and moving bowels regularly, complains of some nausea otherwise " denies any other relevant systemic symptoms.  Zofran prescribed and suggested to continue well hydration.   Plan: Basic metabolic panel  (Ca, Cl, CO2, Creat,         Gluc, K, Na, BUN), ondansetron (ZOFRAN-ODT) 4         MG ODT tab            (E11.40) Type 2 diabetes mellitus with diabetic neuropathy, without long-term current use of insulin (H)  Comment: Patient was diagnosed with diabetic ketoacidosis and started on insulin during hospitalization.  Patient was discharged on metformin home dose.  Home blood glucose readings between 235-290, checking blood glucose twice daily.  Treatment options discussed.  Glipizide prescribed, common side effects discussed.  Suggested to continue strict diabetic diet, monitoring blood glucose 2-4 times daily.  Diabetic educator referral placed and recommended to follow-up with PCP in 2 weeks   Plan: glipiZIDE (GLUCOTROL XL) 5 MG 24 hr tablet,         AMBULATORY ADULT DIABETES EDUCATOR REFERRAL           Patient Instructions     Patient Education     Diet: Diabetes  Food is an important tool that you can use to control diabetes and stay healthy. Eating well-balanced meals in the correct amounts will help you control your blood glucose levels and prevent low blood sugar reactions. It will also help you reduce the health risks of diabetes. There is no one specific diet that is right for everyone with diabetes. But there are general guidelines to follow. A registered dietitian (RD) will create a tailored diet approach that s just right for you. He or she will also help you plan healthy meals and snacks. If you have any questions, call your dietitian for advice.     Guidelines for success  Talk with your healthcare provider before starting a diabetes diet or weight loss program. If you haven't talked with a dietitian yet, ask your provider for a referral. The following guidelines can help you succeed:    Select foods from the 6 food groups below. Your dietitian will help you find food  choices within each group. He or she will also show you serving sizes and how many servings you can have at each meal.  ? Grains, beans, and starchy vegetables  ? Vegetables  ? Fruit  ? Milk or yogurt  ? Meat, poultry, fish, or tofu  ? Healthy fats    Check your blood sugar levels as directed by your provider. Take any medicine as prescribed by your provider.    Learn to read food labels and pick the right portion sizes.    Eat only the amount of food in your meal plan. Eat about the same amount of food at regular times each day. Don t skip meals. Eat meals 4 to 5 hours apart, with snacks in between.    Limit alcohol. It raises blood sugar levels. Drink water or calorie-free diet drinks that use safe sweeteners.    Eat less fat to help lower your risk of heart disease. Use nonfat or low-fat dairy products and lean meats. Avoid fried foods. Use cooking oils that are unsaturated, such as olive, canola, or peanut oil.    Talk with your dietitian about safe sugar substitutes.    Avoid added salt. It can contribute to high blood pressure, which can cause heart disease. People with diabetes already have a risk of high blood pressure and heart disease.    Stay at a healthy weight. If you need to lose weight, cut down on your portion sizes. But don t skip meals. Exercise is an important part of any weight management program. Talk with your provider about an exercise program that s right for you.    For more information about the best diet plan for you, talk with a registered dietitian (RD). To find an RD in your area, contact:  ? Academy of Nutrition and Dietetics www.eatright.org  ? The American Diabetes Association [phone] www.diabetes.org  Date Last Reviewed: 8/1/2016 2000-2019 [employer]. [address] [place] [address], [place] [address]. All rights reserved. This information is not intended as a substitute for professional medical care. Always follow your healthcare professional's instructions.                Olegario Urrutia MD  [facility]

## 2020-04-06 NOTE — LETTER
April 6, 2020      Marvin Horvath  [address] [address]        To Whom It May Concern:      Marvin Horvath was seen in our clinic. He may return to work on 04/13/2020 with restriction of lifting weight <20 pounds for 1 month.      Sincerely,        Olegario Urrutia MD

## 2020-04-15 ENCOUNTER — ALLIED HEALTH/NURSE VISIT (OUTPATIENT)
Dept: EDUCATION SERVICES | Facility: CLINIC | Age: 60
End: 2020-04-15
Payer: COMMERCIAL

## 2020-04-15 DIAGNOSIS — E11.40 TYPE 2 DIABETES MELLITUS WITH DIABETIC NEUROPATHY, WITHOUT LONG-TERM CURRENT USE OF INSULIN (H): ICD-10-CM

## 2020-04-15 PROCEDURE — 98968 PH1 ASSMT&MGMT NQHP 21-30: CPT | Performed by: DIETITIAN, REGISTERED

## 2020-04-15 RX ORDER — GLIPIZIDE 5 MG/1
TABLET, FILM COATED, EXTENDED RELEASE ORAL
Qty: 180 TABLET | Refills: 1 | Status: SHIPPED | OUTPATIENT
Start: 2020-04-15 | End: 2020-05-13

## 2020-04-15 NOTE — PROGRESS NOTES
"Diabetes Self-Management Education & Support    Patient verbally consented to the telephone visit service today: yes      SUBJECTIVE/OBJECTIVE:    Diabetes type: Type 2  Cultural Influences/Ethnic Background:  American      Diabetes Symptoms & Complications:          Patient Problem List and Family Medical History reviewed for relevant medical history, current medical status, and diabetes risk factors.    Vitals:  There were no vitals taken for this visit.  Estimated body mass index is 32.81 kg/m  as calculated from the following:    Height as of 4/6/20: 1.772 m (5' 9.75\").    Weight as of 4/6/20: 103 kg (227 lb).   Last 3 BP:   BP Readings from Last 3 Encounters:   04/06/20 128/74   11/13/19 138/84   05/01/19 130/85       History   Smoking Status     Never Smoker   Smokeless Tobacco     Never Used       Labs:  Lab Results   Component Value Date    A1C 8.5 11/13/2019     Lab Results   Component Value Date     04/06/2020     Lab Results   Component Value Date     01/23/2019     HDL Cholesterol   Date Value Ref Range Status   01/23/2019 32 (L) >39 mg/dL Final   ]  GFR Estimate   Date Value Ref Range Status   04/06/2020 >90 >60 mL/min/[1.73_m2] Final     Comment:     Non  GFR Calc  Starting 12/18/2018, serum creatinine based estimated GFR (eGFR) will be   calculated using the Chronic Kidney Disease Epidemiology Collaboration   (CKD-EPI) equation.       GFR Estimate If Black   Date Value Ref Range Status   04/06/2020 >90 >60 mL/min/[1.73_m2] Final     Comment:      GFR Calc  Starting 12/18/2018, serum creatinine based estimated GFR (eGFR) will be   calculated using the Chronic Kidney Disease Epidemiology Collaboration   (CKD-EPI) equation.       Lab Results   Component Value Date    CR 0.67 04/06/2020     No results found for: MICROALBUMIN    Healthy Eating:   breakfast- two eggs with toast (2), sausage rafaela  occasionally, water   Lunch- mexican bowl: beans, rice veggies or " tuna salad on sandwich  Supper- pork chop, rice pilaf (2 cups), broccoli, water or lasagna, broccoli, water  Snack: has a sweet tooth but is trying to avoid this.  Now has been trying to do yogurt: light greek yogurt, sugar free candy  Beverages: water or occasionally diet soda    Being Active:   still recovering from his surgery  Plans to get out walking once it gets nicer out and then when it gets warmer he likes lisa    Monitorin-245    Taking Medications:  Diabetes Medication(s)     Biguanides       metFORMIN (GLUCOPHAGE) 1000 MG tablet    Take 1 tablet (1,000 mg) by mouth 2 times daily (with meals)    Sulfonylureas       glipiZIDE (GLUCOTROL XL) 5 MG 24 hr tablet    Take 1 tablet (5 mg) by mouth daily               Problem Solving:   not assessed              Reducing Risks:   not assessed    Healthy Coping:     Patient Activation Measure Survey Score:  No flowsheet data found.    Diabetes knowledge and skills assessment:   Patient is knowledgeable in diabetes management concepts related to: Healthy Eating, Being Active and Monitoring    Patient needs further education on the following diabetes management concepts: Healthy Eating, Being Active, Monitoring, Taking Medication, Problem Solving, Reducing Risks and Healthy Coping    Based on learning assessment above, most appropriate setting for further diabetes education would be: Individual setting.      INTERVENTIONS:    Education provided today on:  AADE Self-Care Behaviors:  Healthy Eating: carbohydrate counting, consistency in amount, composition, and timing of food intake and label reading  Being Active: relationship to blood glucose and describe appropriate activity program  Monitoring: individual blood glucose targets and frequency of monitoring  Taking Medication: action of prescribed medication, side effects of prescribed medications and when to take medications  Problem Solving: low blood glucose - causes, signs/symptoms, treatment and  prevention and carrying a carbohydrate source at all times    Opportunities for ongoing education and support in diabetes-self management were discussed.    Pt verbalized understanding of concepts discussed and recommendations provided today.       Education Materials Provided:  Carbohydrate Counting  Mailed it out    ASSESSMENT:  Realizes he needs to make changes. Went over carb counting and discussed exercise.  His BG levels are still too high, increased the glipzide and hope the meal changes and activity helps some too.  Will check on numbers in one month.    Patient's most recent   Lab Results   Component Value Date    A1C 8.5 11/13/2019    is not meeting goal of <7.0  Last a1c was 13.7 at different facility when in for burst appendix  PLAN  See Patient Instructions for co-developed, patient-stated behavior change goals.  AVS printed and provided to patient today. See Follow-Up section for recommended follow-up.      Time Spent: 30 minutes  Encounter Type: Individual    Any diabetes medication dose changes were made via the CDE Protocol and Collaborative Practice Agreement with the patient's primary care provider. A copy of this encounter was shared with the provider.

## 2020-04-20 ENCOUNTER — TELEPHONE (OUTPATIENT)
Dept: FAMILY MEDICINE | Facility: CLINIC | Age: 60
End: 2020-04-20

## 2020-05-13 ENCOUNTER — ALLIED HEALTH/NURSE VISIT (OUTPATIENT)
Dept: EDUCATION SERVICES | Facility: CLINIC | Age: 60
End: 2020-05-13
Payer: COMMERCIAL

## 2020-05-13 DIAGNOSIS — E11.40 TYPE 2 DIABETES MELLITUS WITH DIABETIC NEUROPATHY, WITHOUT LONG-TERM CURRENT USE OF INSULIN (H): ICD-10-CM

## 2020-05-13 PROCEDURE — 98967 PH1 ASSMT&MGMT NQHP 11-20: CPT | Mod: TEL | Performed by: DIETITIAN, REGISTERED

## 2020-05-13 RX ORDER — GLIPIZIDE 5 MG/1
TABLET, FILM COATED, EXTENDED RELEASE ORAL
Qty: 270 TABLET | Refills: 1 | Status: SHIPPED | OUTPATIENT
Start: 2020-05-13 | End: 2020-11-17

## 2020-05-13 NOTE — PROGRESS NOTES
"Diabetes Self-Management Education & Support    Patient verbally consented to the telephone visit service today: yes      SUBJECTIVE/OBJECTIVE:  Presents for: Individual review  Diabetes type: Type 2  Other concerns:: None  Cultural Influences/Ethnic Background:  American    Diabetes Symptoms & Complications:          Patient Problem List and Family Medical History reviewed for relevant medical history, current medical status, and diabetes risk factors.    Vitals:  There were no vitals taken for this visit.  Estimated body mass index is 32.81 kg/m  as calculated from the following:    Height as of 4/6/20: 1.772 m (5' 9.75\").    Weight as of 4/6/20: 103 kg (227 lb).   Last 3 BP:   BP Readings from Last 3 Encounters:   04/06/20 128/74   11/13/19 138/84   05/01/19 130/85       History   Smoking Status     Never Smoker   Smokeless Tobacco     Never Used       Labs:  Lab Results   Component Value Date    A1C 8.5 11/13/2019     Lab Results   Component Value Date     04/06/2020     Lab Results   Component Value Date     01/23/2019     HDL Cholesterol   Date Value Ref Range Status   01/23/2019 32 (L) >39 mg/dL Final   ]  GFR Estimate   Date Value Ref Range Status   04/06/2020 >90 >60 mL/min/[1.73_m2] Final     Comment:     Non  GFR Calc  Starting 12/18/2018, serum creatinine based estimated GFR (eGFR) will be   calculated using the Chronic Kidney Disease Epidemiology Collaboration   (CKD-EPI) equation.       GFR Estimate If Black   Date Value Ref Range Status   04/06/2020 >90 >60 mL/min/[1.73_m2] Final     Comment:      GFR Calc  Starting 12/18/2018, serum creatinine based estimated GFR (eGFR) will be   calculated using the Chronic Kidney Disease Epidemiology Collaboration   (CKD-EPI) equation.       Lab Results   Component Value Date    CR 0.67 04/06/2020     No results found for: MICROALBUMIN    Healthy Eating:  Healthy Eating Assessed Today: Yes  Breakfast: eggs and sausage, " two toast, large sugar free iced coffee  Lunch: roasted chicken, salad (italian salad) or bowl of beans with lettuce, avacado, cheese  Dinner: pork chops, mixed vegetables or BBQ chicken, baked potato or fajitas  Snacks: snacks: sugar free pudding, diabetic slim fast snacks: peanut butter cup, bars and yogurt  Other: , last night. 160-180 in the am.  Beverages: Water, Coffee    Being Active:  Being Active Assessed Today: Yes  Exercise:: Yes(bike ride, walking, kyak out this weekend)    Monitoring:   am 160-180 and later in the day     Taking Medications:  Diabetes Medication(s)     Biguanides       metFORMIN (GLUCOPHAGE) 1000 MG tablet    Take 1 tablet (1,000 mg) by mouth 2 times daily (with meals)    Sulfonylureas       glipiZIDE (GLUCOTROL XL) 5 MG 24 hr tablet    Take 5 mg tablet at breakfast and 5 mg at supper               Problem Solving:   not assessed              Reducing Risks:   not assessed    Healthy Coping:     Patient Activation Measure Survey Score:  No flowsheet data found.    Diabetes knowledge and skills assessment:   Patient is knowledgeable in diabetes management concepts related to: Healthy Eating, Being Active and Monitoring    Patient needs further education on the following diabetes management concepts: Healthy Eating, Being Active, Monitoring, Taking Medication, Problem Solving, Reducing Risks and Healthy Coping    Based on learning assessment above, most appropriate setting for further diabetes education would be: Individual setting.      INTERVENTIONS:    Education provided today on:  AADE Self-Care Behaviors:  Healthy Eating: consistency in amount, composition, and timing of food intake and portion control  Being Active: relationship to blood glucose and describe appropriate activity program  Taking Medication: action of prescribed medication, side effects of prescribed medications and when to take medications    Opportunities for ongoing education and support in  diabetes-self management were discussed.    Pt verbalized understanding of concepts discussed and recommendations provided today.       Education Materials Provided:  No new materials provided today      ASSESSMENT:  Pt still having higher numbers in the am  160-180, increased the Glipzide to 1 tab in am and 2 tabs at supper to see if we can get some better am readings.  He will test his BG if he feels dizzy, lightheaded, sweaty.  He is doing well with his meal plan.  He is working on adding in more activity, goal will be to add in more activity so he can drop down to only 2 tablets of medication if possible.        Patient's most recent   Lab Results   Component Value Date    A1C 8.5 11/13/2019    is not meeting goal of <8.0  Had more recent a1c at another facility at 13.7  PLAN  See Patient Instructions for co-developed, patient-stated behavior change goals.  AVS printed and provided to patient today. See Follow-Up section for recommended follow-up.      Time Spent: 15 minutes  Encounter Type: Individual    Any diabetes medication dose changes were made via the CDE Protocol and Collaborative Practice Agreement with the patient's primary care provider. A copy of this encounter was shared with the provider.

## 2020-06-24 ENCOUNTER — ALLIED HEALTH/NURSE VISIT (OUTPATIENT)
Dept: EDUCATION SERVICES | Facility: CLINIC | Age: 60
End: 2020-06-24
Payer: COMMERCIAL

## 2020-06-24 DIAGNOSIS — E11.40 TYPE 2 DIABETES MELLITUS WITH DIABETIC NEUROPATHY, WITHOUT LONG-TERM CURRENT USE OF INSULIN (H): Primary | ICD-10-CM

## 2020-06-24 NOTE — PROGRESS NOTES
"Diabetes Self-Management Education & Support    Patient verbally consented to the telephone visit service today: yes      SUBJECTIVE/OBJECTIVE:  Presents for: Individual review  Diabetes type: Type 2  Other concerns:: None  Cultural Influences/Ethnic Background:  American      Diabetes Symptoms & Complications:          Patient Problem List and Family Medical History reviewed for relevant medical history, current medical status, and diabetes risk factors.    Vitals:  There were no vitals taken for this visit.  Estimated body mass index is 32.81 kg/m  as calculated from the following:    Height as of 4/6/20: 1.772 m (5' 9.75\").    Weight as of 4/6/20: 103 kg (227 lb).   Last 3 BP:   BP Readings from Last 3 Encounters:   04/06/20 128/74   11/13/19 138/84   05/01/19 130/85       History   Smoking Status     Never Smoker   Smokeless Tobacco     Never Used       Labs:  Lab Results   Component Value Date    A1C 8.5 11/13/2019     Lab Results   Component Value Date     04/06/2020     Lab Results   Component Value Date     01/23/2019     HDL Cholesterol   Date Value Ref Range Status   01/23/2019 32 (L) >39 mg/dL Final   ]  GFR Estimate   Date Value Ref Range Status   04/06/2020 >90 >60 mL/min/[1.73_m2] Final     Comment:     Non  GFR Calc  Starting 12/18/2018, serum creatinine based estimated GFR (eGFR) will be   calculated using the Chronic Kidney Disease Epidemiology Collaboration   (CKD-EPI) equation.       GFR Estimate If Black   Date Value Ref Range Status   04/06/2020 >90 >60 mL/min/[1.73_m2] Final     Comment:      GFR Calc  Starting 12/18/2018, serum creatinine based estimated GFR (eGFR) will be   calculated using the Chronic Kidney Disease Epidemiology Collaboration   (CKD-EPI) equation.       Lab Results   Component Value Date    CR 0.67 04/06/2020     No results found for: MICROALBUMIN    Healthy Eating:  Healthy Eating Assessed Today: Yes  Breakfast: eggs and " sausage, two toast, large sugar free iced coffee or sausage egg biscuit meal, large sf vanilla iced coffee  Lunch: beefy 5 layer burrito or tries to do salad more often when he can, beans and salad taco bowl  Dinner: pork ribs 3, side of peas or salmon, steamed broccoli  Snacks: snacks: sugar free pudding, diabetic slim fast snacks:has a hot chocolate made with stevia, cocoa and half and half/water at night.    Other:   Beverages: Water, Coffee    Being Active:  Being Active Assessed Today: Yes  Exercise:: Yes(bike ride, walking, kyak out this weekend)  Days per week of moderate to strenuous exercise (like a brisk walk): 3  On average, minutes per day of exercise at this level: 30  Exercise Minutes per Week: 90    Monitorin-160 in the am.  in afternoon before dinner: , usually good then    Taking Medications:  Diabetes Medication(s)     Biguanides       metFORMIN (GLUCOPHAGE) 1000 MG tablet    Take 1 tablet (1,000 mg) by mouth 2 times daily (with meals)    Sulfonylureas       glipiZIDE (GLUCOTROL XL) 5 MG 24 hr tablet    Take 5 mg (1 tablet) at breakfast and 10 mg (2 tablets) at supper               Problem Solving:   not assessed              Reducing Risks:   not assessed    Healthy Coping:     Patient Activation Measure Survey Score:  No flowsheet data found.    Diabetes knowledge and skills assessment:   Patient is knowledgeable in diabetes management concepts related to: Healthy Eating, Being Active and Monitoring    Patient needs further education on the following diabetes management concepts: Healthy Eating, Being Active, Monitoring, Taking Medication, Problem Solving, Reducing Risks and Healthy Coping    Based on learning assessment above, most appropriate setting for further diabetes education would be: Individual setting.      INTERVENTIONS:    Education provided today on:  AADE Self-Care Behaviors:  Healthy Eating: consistency in amount, composition, and timing of food intake, portion  control and watching calories on some items  Being Active: relationship to blood glucose and describe appropriate activity program  Monitoring: individual blood glucose targets and frequency of monitoring  Taking Medication: when to take medications    Opportunities for ongoing education and support in diabetes-self management were discussed.    Pt verbalized understanding of concepts discussed and recommendations provided today.       Education Materials Provided:  No new materials provided today      ASSESSMENT:  Numbers still above goal in am but better.  He is trying to watch his carb and that is going fairly well, he has gained 15 lbs he stated since dx and appendectomy.  So we went through his meal plan and I want him to switch the am coffee drink to unsweetened tea with sweetner he is willing to do that.  Also he is making a hot chocolate with half and half I want him to switch the half and half to milk that will save a lot of calories too.  He will also try to make good food choices when getting take out/fast food.  Food choices at home are good.  Also discussed exercise, he likes to kyak so he will try to do that more as well.        Patient's most recent   Lab Results   Component Value Date    A1C 8.5 11/13/2019    is not meeting goal of <8.0    PLAN  See Patient Instructions for co-developed, patient-stated behavior change goals.  AVS printed and provided to patient today. See Follow-Up section for recommended follow-up.      Time Spent: 30 minutes  Encounter Type: Individual    Any diabetes medication dose changes were made via the CDE Protocol and Collaborative Practice Agreement with the patient's primary care provider. A copy of this encounter was shared with the provider.

## 2020-09-23 ENCOUNTER — PATIENT OUTREACH (OUTPATIENT)
Dept: EDUCATION SERVICES | Facility: CLINIC | Age: 60
End: 2020-09-23
Payer: COMMERCIAL

## 2020-11-17 DIAGNOSIS — E11.40 TYPE 2 DIABETES MELLITUS WITH DIABETIC NEUROPATHY, WITHOUT LONG-TERM CURRENT USE OF INSULIN (H): ICD-10-CM

## 2020-11-17 RX ORDER — GLIPIZIDE 5 MG/1
TABLET, FILM COATED, EXTENDED RELEASE ORAL
Qty: 90 TABLET | Refills: 0 | Status: SHIPPED | OUTPATIENT
Start: 2020-11-17 | End: 2020-12-24

## 2020-11-23 ENCOUNTER — TELEPHONE (OUTPATIENT)
Dept: FAMILY MEDICINE | Facility: CLINIC | Age: 60
End: 2020-11-23

## 2020-11-23 NOTE — TELEPHONE ENCOUNTER
Panel Management Review      Patient has the following on his problem list:     Diabetes    ASA: Not Required     Last A1C  Lab Results   Component Value Date    A1C 8.5 11/13/2019    A1C 5.5 05/01/2019    A1C 8.9 01/23/2019    A1C >15 10/24/2018     A1C tested: FAILED    Last LDL:    Lab Results   Component Value Date    CHOL 219 01/23/2019     Lab Results   Component Value Date    HDL 32 01/23/2019     Lab Results   Component Value Date     01/23/2019     Lab Results   Component Value Date    TRIG 267 01/23/2019     No results found for: CHOLHDLRATIO  Lab Results   Component Value Date    NHDL 187 01/23/2019       Is the patient on a Statin? YES             Is the patient on Aspirin? YES    Medications     HMG CoA Reductase Inhibitors     pravastatin (PRAVACHOL) 20 MG tablet       Salicylates     aspirin (ASA) 81 MG tablet             Last three blood pressure readings:  BP Readings from Last 3 Encounters:   04/06/20 128/74   11/13/19 138/84   05/01/19 130/85       Date of last diabetes office visit: 04/6/2020     Tobacco History:     History   Smoking Status     Never Smoker   Smokeless Tobacco     Never Used           Composite cancer screening  Chart review shows that this patient is due/due soon for the following None  Summary:    Patient is due/failing the following:   A1C    Action needed:   Patient needs office visit for A1C and diabetic check .    Type of outreach:    Phone, left message for patient to call back.     Questions for provider review:    None                                                                                                                                    Lorin Martinez MA       Chart routed to  .

## 2020-11-29 ENCOUNTER — HEALTH MAINTENANCE LETTER (OUTPATIENT)
Age: 60
End: 2020-11-29

## 2020-12-20 DIAGNOSIS — E11.40 TYPE 2 DIABETES MELLITUS WITH DIABETIC NEUROPATHY, WITHOUT LONG-TERM CURRENT USE OF INSULIN (H): ICD-10-CM

## 2020-12-24 RX ORDER — GLIPIZIDE 5 MG/1
TABLET, FILM COATED, EXTENDED RELEASE ORAL
Qty: 90 TABLET | Refills: 0 | Status: SHIPPED | OUTPATIENT
Start: 2020-12-24 | End: 2021-03-15

## 2021-03-09 DIAGNOSIS — E11.40 TYPE 2 DIABETES MELLITUS WITH DIABETIC NEUROPATHY, WITHOUT LONG-TERM CURRENT USE OF INSULIN (H): ICD-10-CM

## 2021-03-10 NOTE — TELEPHONE ENCOUNTER
Last seen 4/6/20.  left message for patient to return call.  Needs appointment.  Machelle Garibay RN

## 2021-03-15 RX ORDER — GLIPIZIDE 5 MG/1
TABLET, FILM COATED, EXTENDED RELEASE ORAL
Qty: 90 TABLET | Refills: 0 | Status: ON HOLD | OUTPATIENT
Start: 2021-03-15 | End: 2021-08-14

## 2021-03-15 NOTE — TELEPHONE ENCOUNTER
2nd message left.    Routing refill request to provider for review/approval because:  Lab not current- A1C.  IRWIN Duncan RN

## 2021-04-10 ENCOUNTER — HEALTH MAINTENANCE LETTER (OUTPATIENT)
Age: 61
End: 2021-04-10

## 2021-04-11 DIAGNOSIS — E11.40 TYPE 2 DIABETES MELLITUS WITH DIABETIC NEUROPATHY, WITHOUT LONG-TERM CURRENT USE OF INSULIN (H): ICD-10-CM

## 2021-04-14 RX ORDER — GLIPIZIDE 5 MG/1
TABLET, FILM COATED, EXTENDED RELEASE ORAL
Qty: 90 TABLET | Refills: 0 | OUTPATIENT
Start: 2021-04-14

## 2021-05-19 DIAGNOSIS — E11.40 TYPE 2 DIABETES MELLITUS WITH DIABETIC NEUROPATHY, WITHOUT LONG-TERM CURRENT USE OF INSULIN (H): ICD-10-CM

## 2021-05-19 NOTE — TELEPHONE ENCOUNTER
Medication is being filled for 1 month only due to:  Patient needs to be seen because it has been more than one year since last visit.

## 2021-05-20 ENCOUNTER — VIRTUAL VISIT (OUTPATIENT)
Dept: FAMILY MEDICINE | Facility: CLINIC | Age: 61
End: 2021-05-20
Payer: COMMERCIAL

## 2021-05-20 DIAGNOSIS — B02.9 HERPES ZOSTER WITHOUT COMPLICATION: Primary | ICD-10-CM

## 2021-05-20 DIAGNOSIS — E11.40 TYPE 2 DIABETES MELLITUS WITH DIABETIC NEUROPATHY, WITHOUT LONG-TERM CURRENT USE OF INSULIN (H): ICD-10-CM

## 2021-05-20 PROCEDURE — 99214 OFFICE O/P EST MOD 30 MIN: CPT | Mod: 95 | Performed by: FAMILY MEDICINE

## 2021-05-20 RX ORDER — VALACYCLOVIR HYDROCHLORIDE 1 G/1
1000 TABLET, FILM COATED ORAL 3 TIMES DAILY
Qty: 21 TABLET | Refills: 0 | Status: ON HOLD | OUTPATIENT
Start: 2021-05-20 | End: 2021-08-14

## 2021-05-20 NOTE — PROGRESS NOTES
Marvin is a 60 year old who is being evaluated via a billable video visit.      How would you like to obtain your AVS? MyChart  If the video visit is dropped, the invitation should be resent by: Text to cell phone: [phone]  Will anyone else be joining your video visit? No    Video Start Time: 1045    A: shingles, ophthalmic division      DM2 ,not controlled    P: valtrex.  He knows to watch for eye involvement, will see eye doctor if that happens, stressed this is important, he understands.      Will make f/u appt soon for DM2 labs and refills.  I think he needs insulin given hx of double digit A1C with 2 meds already.        Subjective   Marvin is a 60 year old who presents for the following health issues     HPI     Has rash on L forehead.  Pain, burning.  Eye feels OK.  No rash around eye or nose at this time.  A few days    DM2: not controlled.  A1c has been high, due for f/u visit.  Agrees.          Review of Systems   No fever or cp or sob       Objective           Vitals:  No vitals were obtained today due to virtual visit.    Physical Exam     GEN: Alert and oriented, in no acute distress  Has vesicular rash on red base on L forehead.  Eyes and nose and lids normal        Video-Visit Details    Type of service:  Video Visit    Video End Time:1055    Originating Location (pt. Location): Home    Distant Location (provider location):  [facility]     Platform used for Video Visit: [employer]

## 2021-05-26 ENCOUNTER — TELEPHONE (OUTPATIENT)
Dept: FAMILY MEDICINE | Facility: CLINIC | Age: 61
End: 2021-05-26

## 2021-05-26 NOTE — TELEPHONE ENCOUNTER
Patient Quality Outreach      Summary:    Patient has the following on his problem list/HM:     Diabetes    Last A1C:  Lab Results   Component Value Date    A1C 8.5 11/13/2019    A1C 5.5 05/01/2019       Last LDL:    Lab Results   Component Value Date     01/23/2019       Is the patient on a Statin? Yes          Is the patient on Aspirin? Yes    Medications     HMG CoA Reductase Inhibitors     pravastatin (PRAVACHOL) 20 MG tablet       Salicylates     aspirin (ASA) 81 MG tablet             Last three blood pressure readings:  BP Readings from Last 3 Encounters:   04/06/20 128/74   11/13/19 138/84   05/01/19 130/85            Tobacco Use      Smoking status: Never Smoker      Smokeless tobacco: Never Used          Patient is due/failing the following:   A1C and Diabetic Follow-Up Visit    Type of outreach:    Phone, spoke to patient/parent. pt will call back and schedule on his own     Questions for provider review:    None                                                                                                                                     Lorin Martinez MA       Chart routed to .

## 2021-06-12 DIAGNOSIS — E11.40 TYPE 2 DIABETES MELLITUS WITH DIABETIC NEUROPATHY, WITHOUT LONG-TERM CURRENT USE OF INSULIN (H): ICD-10-CM

## 2021-06-14 NOTE — TELEPHONE ENCOUNTER
Hemoglobin A1C   Date Value Ref Range Status   11/13/2019 8.5 (H) 0 - 5.6 % Final     Comment:     Normal <5.7% Prediabetes 5.7-6.4%  Diabetes 6.5% or higher - adopted from ADA   consensus guidelines.  Results confirmed by repeat test       A1C 3/31/20 was 13.7   Virtual visit on 5/20/21 and was instructed to schedule diabetic check.  I talked with Pk and he tells me he will schedule an appointment  Machelle Garibay RN

## 2021-07-17 DIAGNOSIS — E11.40 TYPE 2 DIABETES MELLITUS WITH DIABETIC NEUROPATHY, WITHOUT LONG-TERM CURRENT USE OF INSULIN (H): ICD-10-CM

## 2021-07-20 NOTE — TELEPHONE ENCOUNTER
"Requested Prescriptions   Pending Prescriptions Disp Refills     metFORMIN (GLUCOPHAGE) 1000 MG tablet [Pharmacy Med Name: METFORMIN HCL 1000MG TABS] 60 tablet 0     Sig: TAKE ONE TABLET BY MOUTH TWICE A DAY WITH MEALS       Biguanide Agents Failed - 7/17/2021  5:01 AM        Failed - Patient has documented A1c within the specified period of time.     If HgbA1C is 8 or greater, it needs to be on file within the past 3 months.  If less than 8, must be on file within the past 6 months.     Recent Labs   Lab Test 11/13/19  0827   A1C 8.5*             Failed - Patient's CR is NOT>1.4 OR Patient's EGFR is NOT<45 within past 12 mos.     Recent Labs   Lab Test 04/06/20  0956   GFRESTIMATED >90   GFRESTBLACK >90       Recent Labs   Lab Test 04/06/20  0956   CR 0.67             Failed - Recent (6 mo) or future (30 days) visit within the authorizing provider's specialty     Patient had office visit in the last 6 months or has a visit in the next 30 days with authorizing provider or within the authorizing provider's specialty.  See \"Patient Info\" tab in inbasket, or \"Choose Columns\" in Meds & Orders section of the refill encounter.            Passed - Patient is age 10 or older        Passed - Patient does NOT have a diagnosis of CHF.        Passed - Medication is active on med list           "

## 2021-07-31 ENCOUNTER — HEALTH MAINTENANCE LETTER (OUTPATIENT)
Age: 61
End: 2021-07-31

## 2021-08-14 ENCOUNTER — HOSPITAL ENCOUNTER (INPATIENT)
Facility: CLINIC | Age: 61
LOS: 6 days | Discharge: HOME | DRG: 177 | End: 2021-08-21
Attending: NURSE PRACTITIONER | Admitting: INTERNAL MEDICINE
Payer: COMMERCIAL

## 2021-08-14 ENCOUNTER — NURSE TRIAGE (OUTPATIENT)
Dept: NURSING | Facility: CLINIC | Age: 61
End: 2021-08-14

## 2021-08-14 ENCOUNTER — APPOINTMENT (OUTPATIENT)
Dept: GENERAL RADIOLOGY | Facility: CLINIC | Age: 61
DRG: 177 | End: 2021-08-14
Attending: NURSE PRACTITIONER
Payer: COMMERCIAL

## 2021-08-14 ENCOUNTER — APPOINTMENT (OUTPATIENT)
Dept: CT IMAGING | Facility: CLINIC | Age: 61
DRG: 177 | End: 2021-08-14
Attending: NURSE PRACTITIONER
Payer: COMMERCIAL

## 2021-08-14 DIAGNOSIS — J96.01 ACUTE RESPIRATORY FAILURE WITH HYPOXIA (H): ICD-10-CM

## 2021-08-14 DIAGNOSIS — U07.1 COVID-19 VIRUS INFECTION: Primary | ICD-10-CM

## 2021-08-14 DIAGNOSIS — J12.82 PNEUMONIA DUE TO 2019 NOVEL CORONAVIRUS: ICD-10-CM

## 2021-08-14 DIAGNOSIS — R11.2 NON-INTRACTABLE VOMITING WITH NAUSEA, UNSPECIFIED VOMITING TYPE: ICD-10-CM

## 2021-08-14 DIAGNOSIS — U07.1 PNEUMONIA DUE TO 2019 NOVEL CORONAVIRUS: ICD-10-CM

## 2021-08-14 DIAGNOSIS — E11.65 UNCONTROLLED TYPE 2 DIABETES MELLITUS WITH HYPERGLYCEMIA (H): ICD-10-CM

## 2021-08-14 DIAGNOSIS — E86.0 DEHYDRATION: ICD-10-CM

## 2021-08-14 PROBLEM — R79.89 TROPONIN LEVEL ELEVATED: Status: ACTIVE | Noted: 2021-08-14

## 2021-08-14 PROBLEM — R73.9 HYPERGLYCEMIA: Status: ACTIVE | Noted: 2021-08-14

## 2021-08-14 LAB
ALBUMIN SERPL-MCNC: 3.2 G/DL (ref 3.4–5)
ALP SERPL-CCNC: 41 U/L (ref 40–150)
ALT SERPL W P-5'-P-CCNC: 22 U/L (ref 0–70)
ANION GAP SERPL CALCULATED.3IONS-SCNC: 20 MMOL/L (ref 3–14)
AST SERPL W P-5'-P-CCNC: 14 U/L (ref 0–45)
BASOPHILS # BLD AUTO: 0 10E3/UL (ref 0–0.2)
BASOPHILS NFR BLD AUTO: 0 %
BILIRUB SERPL-MCNC: 0.5 MG/DL (ref 0.2–1.3)
BUN SERPL-MCNC: 22 MG/DL (ref 7–30)
CALCIUM SERPL-MCNC: 9.2 MG/DL (ref 8.5–10.1)
CHLORIDE BLD-SCNC: 104 MMOL/L (ref 94–109)
CO2 SERPL-SCNC: 9 MMOL/L (ref 20–32)
CREAT SERPL-MCNC: 0.74 MG/DL (ref 0.66–1.25)
CRP SERPL-MCNC: 83 MG/L (ref 0–8)
EOSINOPHIL # BLD AUTO: 0 10E3/UL (ref 0–0.7)
EOSINOPHIL NFR BLD AUTO: 0 %
ERYTHROCYTE [DISTWIDTH] IN BLOOD BY AUTOMATED COUNT: 13.4 % (ref 10–15)
GFR SERPL CREATININE-BSD FRML MDRD: >90 ML/MIN/1.73M2
GLUCOSE BLD-MCNC: 351 MG/DL (ref 70–99)
GLUCOSE BLDC GLUCOMTR-MCNC: 280 MG/DL (ref 70–99)
GLUCOSE BLDC GLUCOMTR-MCNC: 324 MG/DL (ref 70–99)
GLUCOSE BLDC GLUCOMTR-MCNC: 325 MG/DL (ref 70–99)
GLUCOSE BLDC GLUCOMTR-MCNC: 335 MG/DL (ref 70–99)
HCT VFR BLD AUTO: 46.7 % (ref 40–53)
HGB BLD-MCNC: 15.9 G/DL (ref 13.3–17.7)
HOLD SPECIMEN: NORMAL
HOLD SPECIMEN: NORMAL
IMM GRANULOCYTES # BLD: 0.1 10E3/UL
IMM GRANULOCYTES NFR BLD: 1 %
LACTATE SERPL-SCNC: 1.3 MMOL/L (ref 0.7–2)
LYMPHOCYTES # BLD AUTO: 0.6 10E3/UL (ref 0.8–5.3)
LYMPHOCYTES NFR BLD AUTO: 9 %
MCH RBC QN AUTO: 28.4 PG (ref 26.5–33)
MCHC RBC AUTO-ENTMCNC: 34 G/DL (ref 31.5–36.5)
MCV RBC AUTO: 84 FL (ref 78–100)
MONOCYTES # BLD AUTO: 0.7 10E3/UL (ref 0–1.3)
MONOCYTES NFR BLD AUTO: 10 %
NEUTROPHILS # BLD AUTO: 5.4 10E3/UL (ref 1.6–8.3)
NEUTROPHILS NFR BLD AUTO: 80 %
NRBC # BLD AUTO: 0 10E3/UL
NRBC BLD AUTO-RTO: 0 /100
PLATELET # BLD AUTO: 178 10E3/UL (ref 150–450)
POTASSIUM BLD-SCNC: 3.9 MMOL/L (ref 3.4–5.3)
PROT SERPL-MCNC: 7.9 G/DL (ref 6.8–8.8)
RBC # BLD AUTO: 5.59 10E6/UL (ref 4.4–5.9)
SARS-COV-2 RNA RESP QL NAA+PROBE: POSITIVE
SODIUM SERPL-SCNC: 133 MMOL/L (ref 133–144)
TROPONIN I SERPL-MCNC: 0.09 UG/L (ref 0–0.04)
WBC # BLD AUTO: 6.8 10E3/UL (ref 4–11)

## 2021-08-14 PROCEDURE — 36415 COLL VENOUS BLD VENIPUNCTURE: CPT | Performed by: NURSE PRACTITIONER

## 2021-08-14 PROCEDURE — 250N000011 HC RX IP 250 OP 636: Performed by: INTERNAL MEDICINE

## 2021-08-14 PROCEDURE — 84484 ASSAY OF TROPONIN QUANT: CPT | Performed by: NURSE PRACTITIONER

## 2021-08-14 PROCEDURE — 96361 HYDRATE IV INFUSION ADD-ON: CPT

## 2021-08-14 PROCEDURE — 99283 EMERGENCY DEPT VISIT LOW MDM: CPT | Performed by: EMERGENCY MEDICINE

## 2021-08-14 PROCEDURE — C9803 HOPD COVID-19 SPEC COLLECT: HCPCS | Performed by: EMERGENCY MEDICINE

## 2021-08-14 PROCEDURE — 96375 TX/PRO/DX INJ NEW DRUG ADDON: CPT | Performed by: EMERGENCY MEDICINE

## 2021-08-14 PROCEDURE — 96372 THER/PROPH/DIAG INJ SC/IM: CPT | Performed by: INTERNAL MEDICINE

## 2021-08-14 PROCEDURE — 80053 COMPREHEN METABOLIC PANEL: CPT | Performed by: NURSE PRACTITIONER

## 2021-08-14 PROCEDURE — 96375 TX/PRO/DX INJ NEW DRUG ADDON: CPT

## 2021-08-14 PROCEDURE — 83605 ASSAY OF LACTIC ACID: CPT | Performed by: NURSE PRACTITIONER

## 2021-08-14 PROCEDURE — 96374 THER/PROPH/DIAG INJ IV PUSH: CPT | Performed by: EMERGENCY MEDICINE

## 2021-08-14 PROCEDURE — 80143 DRUG ASSAY ACETAMINOPHEN: CPT | Performed by: FAMILY MEDICINE

## 2021-08-14 PROCEDURE — 250N000012 HC RX MED GY IP 250 OP 636 PS 637: Performed by: NURSE PRACTITIONER

## 2021-08-14 PROCEDURE — 85025 COMPLETE CBC W/AUTO DIFF WBC: CPT | Performed by: NURSE PRACTITIONER

## 2021-08-14 PROCEDURE — 87635 SARS-COV-2 COVID-19 AMP PRB: CPT | Performed by: NURSE PRACTITIONER

## 2021-08-14 PROCEDURE — 99207 PR NOT IN PERSON INPATIENT CONSULT STATISTICAL MARKER: CPT | Performed by: INTERNAL MEDICINE

## 2021-08-14 PROCEDURE — 96361 HYDRATE IV INFUSION ADD-ON: CPT | Performed by: EMERGENCY MEDICINE

## 2021-08-14 PROCEDURE — 99285 EMERGENCY DEPT VISIT HI MDM: CPT | Mod: 25 | Performed by: EMERGENCY MEDICINE

## 2021-08-14 PROCEDURE — 99207 PR CDG-MDM COMPONENT: MEETS MODERATE - DOWN CODED: CPT | Mod: 59 | Performed by: INTERNAL MEDICINE

## 2021-08-14 PROCEDURE — 80179 DRUG ASSAY SALICYLATE: CPT | Performed by: FAMILY MEDICINE

## 2021-08-14 PROCEDURE — 250N000011 HC RX IP 250 OP 636: Performed by: NURSE PRACTITIONER

## 2021-08-14 PROCEDURE — G0378 HOSPITAL OBSERVATION PER HR: HCPCS

## 2021-08-14 PROCEDURE — 86140 C-REACTIVE PROTEIN: CPT | Performed by: NURSE PRACTITIONER

## 2021-08-14 PROCEDURE — 250N000013 HC RX MED GY IP 250 OP 250 PS 637: Performed by: NURSE PRACTITIONER

## 2021-08-14 PROCEDURE — 250N000009 HC RX 250: Performed by: NURSE PRACTITIONER

## 2021-08-14 PROCEDURE — 71045 X-RAY EXAM CHEST 1 VIEW: CPT

## 2021-08-14 PROCEDURE — 99219 PR INITIAL OBSERVATION CARE,LEVEL II: CPT | Mod: GT | Performed by: INTERNAL MEDICINE

## 2021-08-14 PROCEDURE — 71275 CT ANGIOGRAPHY CHEST: CPT

## 2021-08-14 PROCEDURE — 258N000003 HC RX IP 258 OP 636: Performed by: NURSE PRACTITIONER

## 2021-08-14 PROCEDURE — 93010 ELECTROCARDIOGRAM REPORT: CPT | Performed by: EMERGENCY MEDICINE

## 2021-08-14 PROCEDURE — 250N000012 HC RX MED GY IP 250 OP 636 PS 637: Performed by: INTERNAL MEDICINE

## 2021-08-14 PROCEDURE — 93005 ELECTROCARDIOGRAM TRACING: CPT | Performed by: EMERGENCY MEDICINE

## 2021-08-14 RX ORDER — ONDANSETRON 4 MG/1
4 TABLET, ORALLY DISINTEGRATING ORAL EVERY 6 HOURS PRN
Status: DISCONTINUED | OUTPATIENT
Start: 2021-08-14 | End: 2021-08-21 | Stop reason: HOSPADM

## 2021-08-14 RX ORDER — ONDANSETRON 2 MG/ML
4 INJECTION INTRAMUSCULAR; INTRAVENOUS EVERY 30 MIN PRN
Status: DISCONTINUED | OUTPATIENT
Start: 2021-08-14 | End: 2021-08-14

## 2021-08-14 RX ORDER — DEXAMETHASONE SODIUM PHOSPHATE 10 MG/ML
6 INJECTION, SOLUTION INTRAMUSCULAR; INTRAVENOUS EVERY 24 HOURS
Status: DISCONTINUED | OUTPATIENT
Start: 2021-08-14 | End: 2021-08-15

## 2021-08-14 RX ORDER — SODIUM CHLORIDE AND POTASSIUM CHLORIDE 150; 900 MG/100ML; MG/100ML
INJECTION, SOLUTION INTRAVENOUS CONTINUOUS
Status: DISCONTINUED | OUTPATIENT
Start: 2021-08-14 | End: 2021-08-15

## 2021-08-14 RX ORDER — ONDANSETRON 2 MG/ML
4 INJECTION INTRAMUSCULAR; INTRAVENOUS EVERY 6 HOURS PRN
Status: DISCONTINUED | OUTPATIENT
Start: 2021-08-14 | End: 2021-08-21 | Stop reason: HOSPADM

## 2021-08-14 RX ORDER — DEXTROSE MONOHYDRATE 25 G/50ML
25-50 INJECTION, SOLUTION INTRAVENOUS
Status: DISCONTINUED | OUTPATIENT
Start: 2021-08-14 | End: 2021-08-17

## 2021-08-14 RX ORDER — PROCHLORPERAZINE MALEATE 5 MG
10 TABLET ORAL EVERY 6 HOURS PRN
Status: DISCONTINUED | OUTPATIENT
Start: 2021-08-14 | End: 2021-08-21 | Stop reason: HOSPADM

## 2021-08-14 RX ORDER — IOPAMIDOL 755 MG/ML
500 INJECTION, SOLUTION INTRAVASCULAR ONCE
Status: COMPLETED | OUTPATIENT
Start: 2021-08-14 | End: 2021-08-14

## 2021-08-14 RX ORDER — NICOTINE POLACRILEX 4 MG
15-30 LOZENGE BUCCAL
Status: DISCONTINUED | OUTPATIENT
Start: 2021-08-14 | End: 2021-08-17

## 2021-08-14 RX ORDER — LIDOCAINE 40 MG/G
CREAM TOPICAL
Status: DISCONTINUED | OUTPATIENT
Start: 2021-08-14 | End: 2021-08-21 | Stop reason: HOSPADM

## 2021-08-14 RX ORDER — PROCHLORPERAZINE 25 MG
25 SUPPOSITORY, RECTAL RECTAL EVERY 12 HOURS PRN
Status: DISCONTINUED | OUTPATIENT
Start: 2021-08-14 | End: 2021-08-21 | Stop reason: HOSPADM

## 2021-08-14 RX ORDER — ACETAMINOPHEN 325 MG/1
650 TABLET ORAL ONCE
Status: COMPLETED | OUTPATIENT
Start: 2021-08-14 | End: 2021-08-14

## 2021-08-14 RX ADMIN — SODIUM CHLORIDE 1000 ML: 9 INJECTION, SOLUTION INTRAVENOUS at 17:20

## 2021-08-14 RX ADMIN — IOPAMIDOL 85 ML: 755 INJECTION, SOLUTION INTRAVENOUS at 18:21

## 2021-08-14 RX ADMIN — HUMAN INSULIN 5 UNITS: 100 INJECTION, SOLUTION SUBCUTANEOUS at 19:49

## 2021-08-14 RX ADMIN — DEXAMETHASONE SODIUM PHOSPHATE 6 MG: 10 INJECTION, SOLUTION INTRAMUSCULAR; INTRAVENOUS at 19:49

## 2021-08-14 RX ADMIN — POTASSIUM CHLORIDE AND SODIUM CHLORIDE: 900; 150 INJECTION, SOLUTION INTRAVENOUS at 22:54

## 2021-08-14 RX ADMIN — INSULIN GLARGINE 30 UNITS: 100 INJECTION, SOLUTION SUBCUTANEOUS at 23:00

## 2021-08-14 RX ADMIN — PROCHLORPERAZINE EDISYLATE 10 MG: 5 INJECTION INTRAMUSCULAR; INTRAVENOUS at 23:07

## 2021-08-14 RX ADMIN — ACETAMINOPHEN 650 MG: 325 TABLET, FILM COATED ORAL at 17:22

## 2021-08-14 RX ADMIN — SODIUM CHLORIDE 70 ML: 9 INJECTION, SOLUTION INTRAVENOUS at 18:21

## 2021-08-14 RX ADMIN — ONDANSETRON 4 MG: 2 INJECTION INTRAMUSCULAR; INTRAVENOUS at 17:11

## 2021-08-14 ASSESSMENT — ACTIVITIES OF DAILY LIVING (ADL)
WEAR_GLASSES_OR_BLIND: NO
DIFFICULTY_EATING/SWALLOWING: NO
CONCENTRATING,_REMEMBERING_OR_MAKING_DECISIONS_DIFFICULTY: NO
WALKING_OR_CLIMBING_STAIRS_DIFFICULTY: NO
DIFFICULTY_COMMUNICATING: NO
TOILETING_ISSUES: NO
DRESSING/BATHING_DIFFICULTY: NO
HEARING_DIFFICULTY_OR_DEAF: NO

## 2021-08-14 NOTE — ED PROVIDER NOTES
History     Chief Complaint   Patient presents with     Suspected Covid     Shortness of Breath     HPI  Marvin Horvath is a 60 year old male with history of T2DM, hyperlipidemia, and GERD who presents to the emergency department for evaluation of shortness of breath.  Symptoms started 11 days ago with initial body aches and chills.  Symptoms worsened with shortness of breath over the last couple days. States he does not have much of a cough, only intermittent. He did have diarrhea earlier in the week, but none in the last couple days.  He has been nauseated and vomiting and retching.  Reports has no appetite and unable to eat for the last 2 days. Denies chest pain, but does have generalized body aches.  Patient takes Metformin twice a day.  Takes no other medications. Nonsmoker.     Allergies:  No Known Allergies    Problem List:    Patient Active Problem List    Diagnosis Date Noted     Nausea and vomiting 08/14/2021     Priority: Medium     Non-intractable vomiting with nausea, unspecified vomiting type 08/14/2021     Priority: Medium     COVID-19 virus infection 08/14/2021     Priority: Medium     Hyperglycemia 08/14/2021     Priority: Medium     Dehydration 08/14/2021     Priority: Medium     Troponin level elevated 08/14/2021     Priority: Medium     Gastroesophageal reflux disease, esophagitis presence not specified 11/13/2019     Priority: Medium     IMO Regulatory Load OCT 2020       Type 2 diabetes mellitus with diabetic neuropathy (H) 01/23/2019     Priority: Medium     Some foot numbness       Hyperlipidemia LDL goal <100 01/23/2019     Priority: Medium        Past Medical History:    History reviewed. No pertinent past medical history.    Past Surgical History:    History reviewed. No pertinent surgical history.    Family History:    History reviewed. No pertinent family history.    Social History:  Marital Status:   [2]  Social History     Tobacco Use     Smoking status: Never Smoker      Smokeless tobacco: Never Used   Substance Use Topics     Alcohol use: No     Drug use: No        Medications:    No current outpatient medications on file.    Review of Systems  As mentioned above in the history present illness. All other systems were reviewed and are negative.    Physical Exam   BP: (!) 201/101  Pulse: 100  Temp: 98  F (36.7  C)  Resp: 18  Weight: 104.3 kg (230 lb)  SpO2: 97 %      Physical Exam  Constitutional:       General: He is in acute distress.      Appearance: He is well-developed. He is ill-appearing.   HENT:      Head: Normocephalic and atraumatic.      Right Ear: Tympanic membrane and external ear normal.      Left Ear: Tympanic membrane and external ear normal.      Nose: Nose normal.      Mouth/Throat:      Pharynx: No oropharyngeal exudate.   Eyes:      Conjunctiva/sclera: Conjunctivae normal.   Cardiovascular:      Rate and Rhythm: Normal rate and regular rhythm.      Heart sounds: Normal heart sounds. No murmur heard.     Pulmonary:      Effort: Tachypnea (RR 30br/min) and respiratory distress present.      Breath sounds: Normal breath sounds.   Abdominal:      Tenderness: There is no abdominal tenderness.   Musculoskeletal:         General: Normal range of motion.   Skin:     General: Skin is warm and dry.      Findings: No rash.   Neurological:      Mental Status: He is alert and oriented to person, place, and time.         ED Course        Procedures              EKG Interpretation:      Interpreted by RICHI Johnson CNP  Time reviewed: 1658  Symptoms at time of EKG: short of breath   Rhythm: normal sinus   Rate: tachycardia (104)  Axis: normal  Ectopy: none  Conduction: normal  ST Segments/ T Waves: Poor R-wave progression. Nonspecific T-abnormality  Q Waves: none  Comparison to prior: No old EKG available    Clinical Impression: Sinus tachycardia with poor R wave progression and nonspecific T abnormality.  No prior comparable EKG           Results for orders placed  or performed during the hospital encounter of 08/14/21 (from the past 24 hour(s))   Glucose by meter   Result Value Ref Range    GLUCOSE BY METER POCT 325 (H) 70 - 99 mg/dL   CBC with platelets differential    Narrative    The following orders were created for panel order CBC with platelets differential.  Procedure                               Abnormality         Status                     ---------                               -----------         ------                     CBC with platelets and d...[[id number]]  Abnormal            Final result                 Please view results for these tests on the individual orders.   Comprehensive metabolic panel   Result Value Ref Range    Sodium 133 133 - 144 mmol/L    Potassium 3.9 3.4 - 5.3 mmol/L    Chloride 104 94 - 109 mmol/L    Carbon Dioxide (CO2) 9 (LL) 20 - 32 mmol/L    Anion Gap 20 (H) 3 - 14 mmol/L    Urea Nitrogen 22 7 - 30 mg/dL    Creatinine 0.74 0.66 - 1.25 mg/dL    Calcium 9.2 8.5 - 10.1 mg/dL    Glucose 351 (H) 70 - 99 mg/dL    Alkaline Phosphatase 41 40 - 150 U/L    AST 14 0 - 45 U/L    ALT 22 0 - 70 U/L    Protein Total 7.9 6.8 - 8.8 g/dL    Albumin 3.2 (L) 3.4 - 5.0 g/dL    Bilirubin Total 0.5 0.2 - 1.3 mg/dL    GFR Estimate >90 >60 mL/min/1.73m2   Troponin I   Result Value Ref Range    Troponin I 0.087 (H) 0.000 - 0.045 ug/L   CBC with platelets and differential   Result Value Ref Range    WBC Count 6.8 4.0 - 11.0 10e3/uL    RBC Count 5.59 4.40 - 5.90 10e6/uL    Hemoglobin 15.9 13.3 - 17.7 g/dL    Hematocrit 46.7 40.0 - 53.0 %    MCV 84 78 - 100 fL    MCH 28.4 26.5 - 33.0 pg    MCHC 34.0 31.5 - 36.5 g/dL    RDW 13.4 10.0 - 15.0 %    Platelet Count 178 150 - 450 10e3/uL    % Neutrophils 80 %    % Lymphocytes 9 %    % Monocytes 10 %    % Eosinophils 0 %    % Basophils 0 %    % Immature Granulocytes 1 %    NRBCs per 100 WBC 0 <1 /100    Absolute Neutrophils 5.4 1.6 - 8.3 10e3/uL    Absolute Lymphocytes 0.6 (L) 0.8 - 5.3 10e3/uL    Absolute Monocytes 0.7  0.0 - 1.3 10e3/uL    Absolute Eosinophils 0.0 0.0 - 0.7 10e3/uL    Absolute Basophils 0.0 0.0 - 0.2 10e3/uL    Absolute Immature Granulocytes 0.1 (H) <=0.0 10e3/uL    Absolute NRBCs 0.0 10e3/uL   Calvin Draw    Narrative    The following orders were created for panel order Calvin Draw.  Procedure                               Abnormality         Status                     ---------                               -----------         ------                     Extra Red Top Tube[[id number]]                               Final result               Extra Green Top (Lithium...[[id number]]                      Final result                 Please view results for these tests on the individual orders.   Extra Red Top Tube   Result Value Ref Range    Hold Specimen JIC    Extra Green Top (Lithium Heparin) ON ICE   Result Value Ref Range    Hold Specimen JIC    Symptomatic COVID-19 Virus (Coronavirus) by PCR Nasopharyngeal    Specimen: Nasopharyngeal; Swab   Result Value Ref Range    SARS CoV2 PCR Positive (A) Negative    Narrative    Testing was performed using the rory  SARS-CoV-2 & Influenza A/B Assay on the rory  Gema  System.  This test should be ordered for the detection of SARS-COV-2 in individuals who meet SARS-CoV-2 clinical and/or epidemiological criteria. Test performance is unknown in asymptomatic patients.  This test is for in vitro diagnostic use under the FDA EUA for laboratories certified under CLIA to perform moderate and/or high complexity testing. This test has not been FDA cleared or approved.  A negative test does not rule out the presence of PCR inhibitors in the specimen or target RNA in concentration below the limit of detection for the assay. The possibility of a false negative should be considered if the patient's recent exposure or clinical presentation suggests COVID-19.  [facility] are certified under the Clinical Laboratory Improvement Amendments of 1988 (CLIA-88) as qualified  to perform moderate and/or high complexity laboratory testing.   Lactic acid whole blood   Result Value Ref Range    Lactic Acid 1.3 0.7 - 2.0 mmol/L   CRP inflammation   Result Value Ref Range    CRP Inflammation 83.0 (H) 0.0 - 8.0 mg/L   XR Chest Port 1 View    Narrative    XR PORTABLE CHEST ONE VIEW   8/14/2021 5:34 PM     HISTORY: Short of breath    COMPARISON: Chest x-ray on 11/13/2019      Impression    IMPRESSION: PA and lateral views of the chest were obtained.  Cardiomediastinal silhouette is within normal limits. No suspicious  focal pulmonary opacities. No significant pleural effusion or  pneumothorax.    VIGNESH RAYMOND MD         SYSTEM ID:  [id number]   Glucose by meter   Result Value Ref Range    GLUCOSE BY METER POCT 335 (H) 70 - 99 mg/dL   CT Chest Pulmonary Embolism w Contrast    Narrative    CT CHEST PULMONARY EMBOLISM WITH CONTRAST  8/14/2021 6:44 PM    HISTORY:  Tachycardia, short of breath, Covid-19 positive. Rule out  pulmonary embolism.      TECHNIQUE: Scans obtained from the apices through the diaphragm with  IV contrast. 85mL, Isovue 370 IV injected. Radiation dose for this  scan was reduced using automated exposure control, adjustment of the  mA and/or kV according to patient size, or iterative reconstruction  technique.    COMPARISON:  None available    FINDINGS:  Chest/mediastinum: No evidence of pulmonary embolism. No cardiomegaly  or significant pericardial effusion. Mild aneurysmal dilatation of the  ascending thoracic aorta measuring 4 cm in diameter. No significant  mediastinal or hilar lymphadenopathy.    Lungs and pleura: No pleural effusion or pneumothorax. Bibasilar and  subpleural predominant patchy ground glass pulmonary opacities,  worrisome for infection including atypical infection like COVID-19.    Upper abdomen: Diffuse hypoattenuation of the liver, likely due to  underlying hepatic steatosis. Moderate amount of stool in the  visualized colon.    Bones and soft tissue:  Multilevel degenerative changes of the spine.  No suspicious osseous lesion.      Impression    IMPRESSION:   1. No evidence of pulmonary embolism.  2. Basilar and subpleural predominant patchy groundglass pulmonary  opacities, worrisome for infection including atypical infection like  COVID-19.  3. Significant hepatic steatosis.    VIGNESH RAYMOND MD         SYSTEM ID:  [id number]   Glucose by meter   Result Value Ref Range    GLUCOSE BY METER POCT 324 (H) 70 - 99 mg/dL       Medications   ondansetron (ZOFRAN) injection 4 mg (4 mg Intravenous Given 8/14/21 1711)   dexamethasone PF (DECADRON) injection 6 mg (6 mg Intravenous Given 8/14/21 1949)   0.9% sodium chloride BOLUS (0 mLs Intravenous Stopped 8/14/21 1818)   acetaminophen (TYLENOL) tablet 650 mg (650 mg Oral Given 8/14/21 1722)   iopamidol (ISOVUE-370) solution 500 mL (85 mLs Intravenous Given 8/14/21 1821)   sodium chloride (PF) 0.9% PF flush 3 mL (3 mLs Intravenous Given 8/14/21 1820)   sodium chloride 0.9 % bag 100mL for CT scan flush use (70 mLs Intravenous Given 8/14/21 1821)   insulin (regular) (HumuLIN R/NovoLIN R) injection 5 Units (5 Units Intravenous Given 8/14/21 1949)     6:00 PM at bedside. I reviewed labs and imaging findings with patient. He is still has tachypnea (28br/min) after 1 liter of fluid and po Tylenol. Elevated troponin. I considered getting D-dimer but expect it to be elevated with covid illness. We will obtain CT of the chest to r/o PE.  6:22 PM recheck blood sugar after 1 liter of fluid is 335. Trying to be conservative with IV fluids due to covid illness.  7:12 PM Chest CT negative for PE. Findings consistent with covid-19 pneumonia.  7:15 PM call out to hospitalist to request observation admission related to covid-19 pneumonia, shortness of breath, and N/V. Patient given IV Dexamethasone 6 mg.   7:30 PM spoke with Dr. Rhodes, on-call hospitalist, who agrees to assume care of patient on admission.    Assessments & Plan (with  Medical Decision Making)   covid-19 pneumonia.  Nauseated and vomiting.  Not much to eat or drink in the last couple days.  Labs are consistent with COVID-19 infection.  Glucose 335.  Chest CT is negative for PE.  Patient has not required any oxygen support.  However, given his diabetes and vomiting I recommend observation admission for dehydration and monitoring for worsening of Covid 19 pneumonia.  Patient was given IV normal saline 1 L bolus, IV Zofran, IV dexamethasone, p.o. Tylenol, and IV regular insulin 5 units.  I spoke with the on-call hospitalist, Dr. Rhodes, who agrees assumed care of patient on admission.    I have reviewed the nursing notes.    I have reviewed the findings, diagnosis, plan and need for follow up with the patient.      Current Discharge Medication List          Final diagnoses:   Pneumonia due to 2019 novel coronavirus   Non-intractable vomiting with nausea, unspecified vomiting type   Dehydration       8/14/2021   [facility] EMERGENCY DEPT     Maryan, RICHI Araya CNP  08/14/21 [id number]

## 2021-08-14 NOTE — TELEPHONE ENCOUNTER
"Patient called in obvious respiratory distress. Only able to speak in short phrases. States \"got sick  In [place]\".  This writer informed patient that he needs to go to ED now.  Patient verbalized understanding and agrees with plan.     Monae Oneill RN  [facility] Nurse Advisor  3:13 PM 8/14/2021      Reason for Disposition    [1] MODERATE difficulty breathing (e.g., speaks in phrases, SOB even at rest, pulse 100-120) AND [2] NEW-onset or WORSE than normal    Additional Information    Negative: [1] Choked on something AND [2] difficulty breathing now    Negative: [1] Breathing stopped AND [2] hasn't returned    Negative: Wheezing or stridor starts suddenly after allergic food, new medicine or bee sting    Negative: Slow, shallow, weak breathing    Negative: Struggling (gasping) for each breath (severe respiratory distress) (Triage tip: Listen to the child's breathing.)    Negative: Unable to speak, cry or suck because of difficulty breathing (Triage tip: Listen to the child's breathing.)    Negative: Making grunting or moaning noises with each breath (Triage tip: Listen to the child's breathing.)    Negative: Bluish (or gray) color of lips or face now    Negative: Can't think clearly or not alert    Negative: Sounds like a life-threatening emergency to the triager    Negative: [1] Breathing stopped AND [2] hasn't returned    Negative: Choking on something    Negative: Severe difficulty breathing (e.g., struggling for each breath, speaks in single words)    Negative: Bluish (or gray) lips or face now    Negative: Difficult to awaken or acting confused (e.g., disoriented, slurred speech)    Negative: Passed out (i.e., lost consciousness, collapsed and was not responding)    Negative: Wheezing started suddenly after medicine, an allergic food or bee sting    Negative: Stridor    Negative: Slow, shallow and weak breathing    Negative: Sounds like a life-threatening emergency to the triager    Protocols used: " BREATHING DIFFICULTY-A-AH, BREATHING DIFFICULTY (RESPIRATORY DISTRESS)-P-AH    COVID 19 Nurse Triage Plan/Patient Instructions    Please be aware that novel coronavirus (COVID-19) may be circulating in the community. If you develop symptoms such as fever, cough, or SOB or if you have concerns about the presence of another infection including coronavirus (COVID-19), please contact your health care provider or visit [email or web address].     Disposition/Instructions    ED Visit recommended. Follow protocol based instructions.     Bring Your Own Device:  Please also bring your smart device(s) (smart phones, tablets, laptops) and their charging cables for your personal use and to communicate with your care team during your visit.    Thank you for taking steps to prevent the spread of this virus.  o Limit your contact with others.  o Wear a simple mask to cover your cough.  o Wash your hands well and often.    Resources    [facility]: About COVID-19: [email or web address]    CDC: What to Do If You're Sick: www.cdc.gov/coronavirus/2019-ncov/about/steps-when-sick.html    CDC: Ending Home Isolation: www.cdc.gov/coronavirus/2019-ncov/hcp/disposition-in-home-patients.html     CDC: Caring for Someone: www.cdc.gov/coronavirus/2019-ncov/if-you-are-sick/care-for-someone.html     [facility]: Interim Guidance for Hospital Discharge to Home: www.health.[facility].[place].us/diseases/coronavirus/hcp/hospdischarge.pdf    [facility] clinical trials (COVID-19 research studies): [email or web address]     Below are the COVID-19 hotlines at the [place] Department of [facility] ([facility]). Interpreters are available.   o For health questions: Call 081-199-6500 or 1-521.422.4702 (7 a.m. to 7 p.m.)  o For questions about schools and childcare: Call 479-290-4322 or 1-480.357.5076 (7 a.m. to 7 p.m.)

## 2021-08-14 NOTE — ED TRIAGE NOTES
Cough, sob that is worsening x11 days. Sob is worsening, pt has been very thirsty and is type 2 diabetic - glucometer reading 'error' takes metformin, no insulin.

## 2021-08-15 PROBLEM — J12.82 PNEUMONIA DUE TO 2019 NOVEL CORONAVIRUS: Status: ACTIVE | Noted: 2021-08-15

## 2021-08-15 PROBLEM — H66.92 ACUTE OTITIS MEDIA OF LEFT EAR WITH PERFORATED TYMPANIC MEMBRANE: Status: ACTIVE | Noted: 2021-08-15

## 2021-08-15 PROBLEM — E11.10 DKA (DIABETIC KETOACIDOSES): Status: ACTIVE | Noted: 2021-08-15

## 2021-08-15 PROBLEM — R11.2 NON-INTRACTABLE VOMITING WITH NAUSEA, UNSPECIFIED VOMITING TYPE: Status: RESOLVED | Noted: 2021-08-14 | Resolved: 2021-08-15

## 2021-08-15 PROBLEM — H72.92 ACUTE OTITIS MEDIA OF LEFT EAR WITH PERFORATED TYMPANIC MEMBRANE: Status: ACTIVE | Noted: 2021-08-15

## 2021-08-15 PROBLEM — R65.10 SIRS (SYSTEMIC INFLAMMATORY RESPONSE SYNDROME) (H): Status: ACTIVE | Noted: 2021-08-15

## 2021-08-15 PROBLEM — R06.03 RESPIRATORY DISTRESS: Status: ACTIVE | Noted: 2021-08-15

## 2021-08-15 PROBLEM — U07.1 PNEUMONIA DUE TO 2019 NOVEL CORONAVIRUS: Status: ACTIVE | Noted: 2021-08-15

## 2021-08-15 LAB
ABO/RH(D): NORMAL
ANION GAP SERPL CALCULATED.3IONS-SCNC: 11 MMOL/L (ref 3–14)
ANION GAP SERPL CALCULATED.3IONS-SCNC: 12 MMOL/L (ref 3–14)
ANION GAP SERPL CALCULATED.3IONS-SCNC: 15 MMOL/L (ref 3–14)
ANION GAP SERPL CALCULATED.3IONS-SCNC: 15 MMOL/L (ref 3–14)
ANION GAP SERPL CALCULATED.3IONS-SCNC: 18 MMOL/L (ref 3–14)
ANION GAP SERPL CALCULATED.3IONS-SCNC: 19 MMOL/L (ref 3–14)
ANION GAP SERPL CALCULATED.3IONS-SCNC: 9 MMOL/L (ref 3–14)
APAP SERPL-MCNC: <2 MG/L (ref 10–30)
APTT PPP: 27 SECONDS (ref 22–38)
BASE EXCESS BLDA CALC-SCNC: -23.4 MMOL/L (ref -9–1.8)
BASE EXCESS BLDV CALC-SCNC: -11.6 MMOL/L (ref -7.7–1.9)
BASE EXCESS BLDV CALC-SCNC: -13.1 MMOL/L (ref -7.7–1.9)
BASE EXCESS BLDV CALC-SCNC: -15.7 MMOL/L (ref -7.7–1.9)
BASE EXCESS BLDV CALC-SCNC: -18.5 MMOL/L (ref -7.7–1.9)
BASE EXCESS BLDV CALC-SCNC: -20.3 MMOL/L (ref -7.7–1.9)
BASE EXCESS BLDV CALC-SCNC: -21.1 MMOL/L (ref -7.7–1.9)
BASE EXCESS BLDV CALC-SCNC: -8.6 MMOL/L (ref -7.7–1.9)
BUN SERPL-MCNC: 13 MG/DL (ref 7–30)
BUN SERPL-MCNC: 15 MG/DL (ref 7–30)
BUN SERPL-MCNC: 17 MG/DL (ref 7–30)
BUN SERPL-MCNC: 17 MG/DL (ref 7–30)
BUN SERPL-MCNC: 20 MG/DL (ref 7–30)
BUN SERPL-MCNC: 21 MG/DL (ref 7–30)
BUN SERPL-MCNC: 21 MG/DL (ref 7–30)
CALCIUM SERPL-MCNC: 7.6 MG/DL (ref 8.5–10.1)
CALCIUM SERPL-MCNC: 7.9 MG/DL (ref 8.5–10.1)
CALCIUM SERPL-MCNC: 8.1 MG/DL (ref 8.5–10.1)
CALCIUM SERPL-MCNC: 8.1 MG/DL (ref 8.5–10.1)
CALCIUM SERPL-MCNC: 8.5 MG/DL (ref 8.5–10.1)
CHLORIDE BLD-SCNC: 107 MMOL/L (ref 94–109)
CHLORIDE BLD-SCNC: 109 MMOL/L (ref 94–109)
CHLORIDE BLD-SCNC: 110 MMOL/L (ref 94–109)
CHLORIDE BLD-SCNC: 111 MMOL/L (ref 94–109)
CHLORIDE BLD-SCNC: 112 MMOL/L (ref 94–109)
CHLORIDE BLD-SCNC: 113 MMOL/L (ref 94–109)
CHLORIDE BLD-SCNC: 114 MMOL/L (ref 94–109)
CHOLEST SERPL-MCNC: 160 MG/DL
CO2 SERPL-SCNC: 10 MMOL/L (ref 20–32)
CO2 SERPL-SCNC: 11 MMOL/L (ref 20–32)
CO2 SERPL-SCNC: 14 MMOL/L (ref 20–32)
CO2 SERPL-SCNC: 14 MMOL/L (ref 20–32)
CO2 SERPL-SCNC: 17 MMOL/L (ref 20–32)
CO2 SERPL-SCNC: 7 MMOL/L (ref 20–32)
CO2 SERPL-SCNC: 8 MMOL/L (ref 20–32)
CREAT SERPL-MCNC: 0.68 MG/DL (ref 0.66–1.25)
CREAT SERPL-MCNC: 0.74 MG/DL (ref 0.66–1.25)
CREAT SERPL-MCNC: 0.74 MG/DL (ref 0.66–1.25)
CREAT SERPL-MCNC: 0.75 MG/DL (ref 0.66–1.25)
CREAT SERPL-MCNC: 0.75 MG/DL (ref 0.66–1.25)
CREAT SERPL-MCNC: 0.76 MG/DL (ref 0.66–1.25)
CREAT SERPL-MCNC: 0.78 MG/DL (ref 0.66–1.25)
CRP SERPL-MCNC: 73.7 MG/L (ref 0–8)
D DIMER PPP FEU-MCNC: 1.15 UG/ML FEU (ref 0–0.5)
ERYTHROCYTE [DISTWIDTH] IN BLOOD BY AUTOMATED COUNT: 13.8 % (ref 10–15)
FASTING STATUS PATIENT QL REPORTED: YES
FERRITIN SERPL-MCNC: 1606 NG/ML (ref 26–388)
FIBRINOGEN PPP-MCNC: 716 MG/DL (ref 170–490)
GFR SERPL CREATININE-BSD FRML MDRD: >90 ML/MIN/1.73M2
GLUCOSE BLD-MCNC: 193 MG/DL (ref 70–99)
GLUCOSE BLD-MCNC: 196 MG/DL (ref 70–99)
GLUCOSE BLD-MCNC: 210 MG/DL (ref 70–99)
GLUCOSE BLD-MCNC: 254 MG/DL (ref 70–99)
GLUCOSE BLD-MCNC: 317 MG/DL (ref 70–99)
GLUCOSE BLD-MCNC: 318 MG/DL (ref 70–99)
GLUCOSE BLD-MCNC: 325 MG/DL (ref 70–99)
GLUCOSE BLD-MCNC: 402 MG/DL (ref 70–99)
GLUCOSE BLDC GLUCOMTR-MCNC: 161 MG/DL (ref 70–99)
GLUCOSE BLDC GLUCOMTR-MCNC: 171 MG/DL (ref 70–99)
GLUCOSE BLDC GLUCOMTR-MCNC: 172 MG/DL (ref 70–99)
GLUCOSE BLDC GLUCOMTR-MCNC: 182 MG/DL (ref 70–99)
GLUCOSE BLDC GLUCOMTR-MCNC: 187 MG/DL (ref 70–99)
GLUCOSE BLDC GLUCOMTR-MCNC: 192 MG/DL (ref 70–99)
GLUCOSE BLDC GLUCOMTR-MCNC: 192 MG/DL (ref 70–99)
GLUCOSE BLDC GLUCOMTR-MCNC: 196 MG/DL (ref 70–99)
GLUCOSE BLDC GLUCOMTR-MCNC: 228 MG/DL (ref 70–99)
GLUCOSE BLDC GLUCOMTR-MCNC: 253 MG/DL (ref 70–99)
GLUCOSE BLDC GLUCOMTR-MCNC: 280 MG/DL (ref 70–99)
GLUCOSE BLDC GLUCOMTR-MCNC: 305 MG/DL (ref 70–99)
GLUCOSE BLDC GLUCOMTR-MCNC: 312 MG/DL (ref 70–99)
GLUCOSE BLDC GLUCOMTR-MCNC: 373 MG/DL (ref 70–99)
HBA1C MFR BLD: 12.8 % (ref 0–5.6)
HCO3 BLD-SCNC: 4 MMOL/L (ref 21–28)
HCO3 BLDV-SCNC: 11 MMOL/L (ref 21–28)
HCO3 BLDV-SCNC: 13 MMOL/L (ref 21–28)
HCO3 BLDV-SCNC: 14 MMOL/L (ref 21–28)
HCO3 BLDV-SCNC: 17 MMOL/L (ref 21–28)
HCO3 BLDV-SCNC: 8 MMOL/L (ref 21–28)
HCO3 BLDV-SCNC: 8 MMOL/L (ref 21–28)
HCO3 BLDV-SCNC: 9 MMOL/L (ref 21–28)
HCT VFR BLD AUTO: 46.5 % (ref 40–53)
HDLC SERPL-MCNC: 22 MG/DL
HGB BLD-MCNC: 15.2 G/DL (ref 13.3–17.7)
INR PPP: 1.16 (ref 0.85–1.15)
KETONES BLD-SCNC: 1.4 MMOL/L (ref 0–0.6)
KETONES BLD-SCNC: 3 MMOL/L (ref 0–0.6)
KETONES BLD-SCNC: 3.2 MMOL/L (ref 0–0.6)
KETONES BLD-SCNC: 4 MMOL/L (ref 0–0.6)
KETONES BLD-SCNC: 4.2 MMOL/L (ref 0–0.6)
KETONES BLD-SCNC: 4.6 MMOL/L (ref 0–0.6)
KETONES BLD-SCNC: 5.1 MMOL/L (ref 0–0.6)
LACTATE SERPL-SCNC: 0.9 MMOL/L (ref 0.7–2)
LACTATE SERPL-SCNC: 1.2 MMOL/L (ref 0.7–2)
LDH SERPL L TO P-CCNC: 323 U/L (ref 85–227)
LDLC SERPL CALC-MCNC: 98 MG/DL
MAGNESIUM SERPL-MCNC: 2.4 MG/DL (ref 1.6–2.3)
MCH RBC QN AUTO: 28.1 PG (ref 26.5–33)
MCHC RBC AUTO-ENTMCNC: 32.7 G/DL (ref 31.5–36.5)
MCV RBC AUTO: 86 FL (ref 78–100)
NONHDLC SERPL-MCNC: 138 MG/DL
O2/TOTAL GAS SETTING VFR VENT: 21 %
OSMOLALITY SERPL: 316 MMOL/KG (ref 275–295)
PCO2 BLD: 14 MM HG (ref 35–45)
PCO2 BLDV: 24 MM HG (ref 40–50)
PCO2 BLDV: 25 MM HG (ref 40–50)
PCO2 BLDV: 26 MM HG (ref 40–50)
PCO2 BLDV: 30 MM HG (ref 40–50)
PCO2 BLDV: 32 MM HG (ref 40–50)
PCO2 BLDV: 33 MM HG (ref 40–50)
PCO2 BLDV: 34 MM HG (ref 40–50)
PH BLD: 7.08 [PH] (ref 7.35–7.45)
PH BLDV: 7.08 [PH] (ref 7.32–7.43)
PH BLDV: 7.11 [PH] (ref 7.32–7.43)
PH BLDV: 7.14 [PH] (ref 7.32–7.43)
PH BLDV: 7.19 [PH] (ref 7.32–7.43)
PH BLDV: 7.22 [PH] (ref 7.32–7.43)
PH BLDV: 7.26 [PH] (ref 7.32–7.43)
PH BLDV: 7.3 [PH] (ref 7.32–7.43)
PHOSPHATE SERPL-MCNC: 2.9 MG/DL (ref 2.5–4.5)
PLATELET # BLD AUTO: 182 10E3/UL (ref 150–450)
PO2 BLD: 104 MM HG (ref 80–105)
PO2 BLDV: 27 MM HG (ref 25–47)
PO2 BLDV: 27 MM HG (ref 25–47)
PO2 BLDV: 28 MM HG (ref 25–47)
PO2 BLDV: 30 MM HG (ref 25–47)
PO2 BLDV: 31 MM HG (ref 25–47)
PO2 BLDV: 34 MM HG (ref 25–47)
PO2 BLDV: 39 MM HG (ref 25–47)
POTASSIUM BLD-SCNC: 3.4 MMOL/L (ref 3.4–5.3)
POTASSIUM BLD-SCNC: 3.5 MMOL/L (ref 3.4–5.3)
POTASSIUM BLD-SCNC: 3.5 MMOL/L (ref 3.4–5.3)
POTASSIUM BLD-SCNC: 3.7 MMOL/L (ref 3.4–5.3)
POTASSIUM BLD-SCNC: 3.7 MMOL/L (ref 3.4–5.3)
POTASSIUM BLD-SCNC: 4 MMOL/L (ref 3.4–5.3)
POTASSIUM BLD-SCNC: 4 MMOL/L (ref 3.4–5.3)
PROCALCITONIN SERPL-MCNC: <0.05 NG/ML
RBC # BLD AUTO: 5.41 10E6/UL (ref 4.4–5.9)
RETICS # AUTO: 0.07 10E6/UL (ref 0.03–0.1)
RETICS/RBC NFR AUTO: 1.2 % (ref 0.5–2)
SALICYLATES SERPL-MCNC: <2 MG/DL
SODIUM SERPL-SCNC: 133 MMOL/L (ref 133–144)
SODIUM SERPL-SCNC: 135 MMOL/L (ref 133–144)
SODIUM SERPL-SCNC: 135 MMOL/L (ref 133–144)
SODIUM SERPL-SCNC: 137 MMOL/L (ref 133–144)
SODIUM SERPL-SCNC: 138 MMOL/L (ref 133–144)
SODIUM SERPL-SCNC: 139 MMOL/L (ref 133–144)
SODIUM SERPL-SCNC: 139 MMOL/L (ref 133–144)
SPECIMEN EXPIRATION DATE: NORMAL
TRIGL SERPL-MCNC: 200 MG/DL
TROPONIN I SERPL-MCNC: 0.11 UG/L (ref 0–0.04)
TROPONIN I SERPL-MCNC: 0.11 UG/L (ref 0–0.04)
WBC # BLD AUTO: 6.6 10E3/UL (ref 4–11)

## 2021-08-15 PROCEDURE — 82947 ASSAY GLUCOSE BLOOD QUANT: CPT | Performed by: INTERNAL MEDICINE

## 2021-08-15 PROCEDURE — 85384 FIBRINOGEN ACTIVITY: CPT | Performed by: FAMILY MEDICINE

## 2021-08-15 PROCEDURE — 36600 WITHDRAWAL OF ARTERIAL BLOOD: CPT

## 2021-08-15 PROCEDURE — 200N000001 HC R&B ICU

## 2021-08-15 PROCEDURE — 99291 CRITICAL CARE FIRST HOUR: CPT | Performed by: FAMILY MEDICINE

## 2021-08-15 PROCEDURE — 36415 COLL VENOUS BLD VENIPUNCTURE: CPT | Performed by: FAMILY MEDICINE

## 2021-08-15 PROCEDURE — 85045 AUTOMATED RETICULOCYTE COUNT: CPT | Performed by: FAMILY MEDICINE

## 2021-08-15 PROCEDURE — 82803 BLOOD GASES ANY COMBINATION: CPT | Performed by: FAMILY MEDICINE

## 2021-08-15 PROCEDURE — 86900 BLOOD TYPING SEROLOGIC ABO: CPT | Performed by: FAMILY MEDICINE

## 2021-08-15 PROCEDURE — 82728 ASSAY OF FERRITIN: CPT | Performed by: FAMILY MEDICINE

## 2021-08-15 PROCEDURE — 85610 PROTHROMBIN TIME: CPT | Performed by: FAMILY MEDICINE

## 2021-08-15 PROCEDURE — 250N000011 HC RX IP 250 OP 636: Performed by: INTERNAL MEDICINE

## 2021-08-15 PROCEDURE — 36415 COLL VENOUS BLD VENIPUNCTURE: CPT | Performed by: INTERNAL MEDICINE

## 2021-08-15 PROCEDURE — 250N000013 HC RX MED GY IP 250 OP 250 PS 637: Performed by: INTERNAL MEDICINE

## 2021-08-15 PROCEDURE — 82947 ASSAY GLUCOSE BLOOD QUANT: CPT | Performed by: NURSE PRACTITIONER

## 2021-08-15 PROCEDURE — 82947 ASSAY GLUCOSE BLOOD QUANT: CPT | Performed by: FAMILY MEDICINE

## 2021-08-15 PROCEDURE — 84484 ASSAY OF TROPONIN QUANT: CPT | Performed by: INTERNAL MEDICINE

## 2021-08-15 PROCEDURE — G0378 HOSPITAL OBSERVATION PER HR: HCPCS

## 2021-08-15 PROCEDURE — 250N000012 HC RX MED GY IP 250 OP 636 PS 637: Performed by: INTERNAL MEDICINE

## 2021-08-15 PROCEDURE — 85027 COMPLETE CBC AUTOMATED: CPT | Performed by: INTERNAL MEDICINE

## 2021-08-15 PROCEDURE — 80061 LIPID PANEL: CPT | Performed by: FAMILY MEDICINE

## 2021-08-15 PROCEDURE — 86140 C-REACTIVE PROTEIN: CPT | Performed by: FAMILY MEDICINE

## 2021-08-15 PROCEDURE — 83036 HEMOGLOBIN GLYCOSYLATED A1C: CPT | Performed by: INTERNAL MEDICINE

## 2021-08-15 PROCEDURE — 85379 FIBRIN DEGRADATION QUANT: CPT | Performed by: FAMILY MEDICINE

## 2021-08-15 PROCEDURE — 84100 ASSAY OF PHOSPHORUS: CPT | Performed by: FAMILY MEDICINE

## 2021-08-15 PROCEDURE — 83930 ASSAY OF BLOOD OSMOLALITY: CPT | Performed by: FAMILY MEDICINE

## 2021-08-15 PROCEDURE — 96376 TX/PRO/DX INJ SAME DRUG ADON: CPT

## 2021-08-15 PROCEDURE — 83735 ASSAY OF MAGNESIUM: CPT | Performed by: FAMILY MEDICINE

## 2021-08-15 PROCEDURE — 83615 LACTATE (LD) (LDH) ENZYME: CPT | Performed by: FAMILY MEDICINE

## 2021-08-15 PROCEDURE — 85730 THROMBOPLASTIN TIME PARTIAL: CPT | Performed by: FAMILY MEDICINE

## 2021-08-15 PROCEDURE — 84145 PROCALCITONIN (PCT): CPT | Performed by: FAMILY MEDICINE

## 2021-08-15 PROCEDURE — 258N000003 HC RX IP 258 OP 636: Performed by: FAMILY MEDICINE

## 2021-08-15 PROCEDURE — 250N000011 HC RX IP 250 OP 636: Performed by: FAMILY MEDICINE

## 2021-08-15 PROCEDURE — 250N000012 HC RX MED GY IP 250 OP 636 PS 637: Performed by: FAMILY MEDICINE

## 2021-08-15 PROCEDURE — 83605 ASSAY OF LACTIC ACID: CPT | Performed by: FAMILY MEDICINE

## 2021-08-15 PROCEDURE — 96361 HYDRATE IV INFUSION ADD-ON: CPT

## 2021-08-15 PROCEDURE — 82010 KETONE BODYS QUAN: CPT | Performed by: FAMILY MEDICINE

## 2021-08-15 PROCEDURE — 250N000013 HC RX MED GY IP 250 OP 250 PS 637: Performed by: FAMILY MEDICINE

## 2021-08-15 RX ORDER — DEXTROSE MONOHYDRATE 25 G/50ML
25-50 INJECTION, SOLUTION INTRAVENOUS
Status: DISCONTINUED | OUTPATIENT
Start: 2021-08-15 | End: 2021-08-15

## 2021-08-15 RX ORDER — DEXTROSE MONOHYDRATE 25 G/50ML
25-50 INJECTION, SOLUTION INTRAVENOUS
Status: DISCONTINUED | OUTPATIENT
Start: 2021-08-15 | End: 2021-08-17

## 2021-08-15 RX ORDER — ASPIRIN 81 MG/1
81 TABLET ORAL DAILY
Status: DISCONTINUED | OUTPATIENT
Start: 2021-08-15 | End: 2021-08-21 | Stop reason: HOSPADM

## 2021-08-15 RX ORDER — SODIUM CHLORIDE AND POTASSIUM CHLORIDE 150; 450 MG/100ML; MG/100ML
INJECTION, SOLUTION INTRAVENOUS CONTINUOUS
Status: DISCONTINUED | OUTPATIENT
Start: 2021-08-15 | End: 2021-08-17

## 2021-08-15 RX ORDER — SODIUM CHLORIDE, SODIUM LACTATE, POTASSIUM CHLORIDE, CALCIUM CHLORIDE 600; 310; 30; 20 MG/100ML; MG/100ML; MG/100ML; MG/100ML
INJECTION, SOLUTION INTRAVENOUS CONTINUOUS
Status: DISCONTINUED | OUTPATIENT
Start: 2021-08-15 | End: 2021-08-15

## 2021-08-15 RX ORDER — NICOTINE POLACRILEX 4 MG
15-30 LOZENGE BUCCAL
Status: DISCONTINUED | OUTPATIENT
Start: 2021-08-15 | End: 2021-08-17

## 2021-08-15 RX ORDER — DEXTROSE MONOHYDRATE, SODIUM CHLORIDE, AND POTASSIUM CHLORIDE 50; 1.49; 4.5 G/1000ML; G/1000ML; G/1000ML
INJECTION, SOLUTION INTRAVENOUS CONTINUOUS
Status: DISCONTINUED | OUTPATIENT
Start: 2021-08-15 | End: 2021-08-17

## 2021-08-15 RX ORDER — NICOTINE POLACRILEX 4 MG
15-30 LOZENGE BUCCAL
Status: DISCONTINUED | OUTPATIENT
Start: 2021-08-15 | End: 2021-08-15

## 2021-08-15 RX ORDER — ACETAMINOPHEN 325 MG/1
650 TABLET ORAL EVERY 6 HOURS PRN
Status: DISCONTINUED | OUTPATIENT
Start: 2021-08-15 | End: 2021-08-21 | Stop reason: HOSPADM

## 2021-08-15 RX ADMIN — DEXAMETHASONE 6 MG: 2 TABLET ORAL at 13:45

## 2021-08-15 RX ADMIN — Medication 1 LOZENGE: at 03:13

## 2021-08-15 RX ADMIN — ENOXAPARIN SODIUM 40 MG: 40 INJECTION SUBCUTANEOUS at 09:38

## 2021-08-15 RX ADMIN — SODIUM CHLORIDE 5 UNITS/HR: 9 INJECTION, SOLUTION INTRAVENOUS at 10:48

## 2021-08-15 RX ADMIN — ONDANSETRON 4 MG: 2 INJECTION INTRAMUSCULAR; INTRAVENOUS at 08:07

## 2021-08-15 RX ADMIN — ENOXAPARIN SODIUM 50 MG: 60 INJECTION SUBCUTANEOUS at 22:00

## 2021-08-15 RX ADMIN — SODIUM CHLORIDE 8 UNITS/HR: 9 INJECTION, SOLUTION INTRAVENOUS at 17:16

## 2021-08-15 RX ADMIN — SODIUM CHLORIDE, POTASSIUM CHLORIDE, SODIUM LACTATE AND CALCIUM CHLORIDE: 600; 310; 30; 20 INJECTION, SOLUTION INTRAVENOUS at 08:16

## 2021-08-15 RX ADMIN — SODIUM CHLORIDE 1000 ML: 9 INJECTION, SOLUTION INTRAVENOUS at 10:08

## 2021-08-15 RX ADMIN — ACETAMINOPHEN 650 MG: 325 TABLET, FILM COATED ORAL at 03:12

## 2021-08-15 RX ADMIN — AMOXICILLIN AND CLAVULANATE POTASSIUM 1 TABLET: 875; 125 TABLET, FILM COATED ORAL at 19:59

## 2021-08-15 RX ADMIN — INSULIN GLARGINE 30 UNITS: 100 INJECTION, SOLUTION SUBCUTANEOUS at 08:10

## 2021-08-15 RX ADMIN — POTASSIUM CHLORIDE AND SODIUM CHLORIDE: 450; 150 INJECTION, SOLUTION INTRAVENOUS at 11:20

## 2021-08-15 RX ADMIN — ASPIRIN 81 MG: 81 TABLET, DELAYED RELEASE ORAL at 09:38

## 2021-08-15 RX ADMIN — INSULIN ASPART 7 UNITS: 100 INJECTION, SOLUTION INTRAVENOUS; SUBCUTANEOUS at 08:17

## 2021-08-15 RX ADMIN — AMOXICILLIN AND CLAVULANATE POTASSIUM 1 TABLET: 875; 125 TABLET, FILM COATED ORAL at 11:03

## 2021-08-15 RX ADMIN — POTASSIUM CHLORIDE, DEXTROSE MONOHYDRATE AND SODIUM CHLORIDE: 150; 5; 450 INJECTION, SOLUTION INTRAVENOUS at 16:11

## 2021-08-15 ASSESSMENT — ACTIVITIES OF DAILY LIVING (ADL)
ADLS_ACUITY_SCORE: 13

## 2021-08-15 NOTE — PLAN OF CARE
S- Transfer to [address] from [address] [place] [address]. Took over patient care at 1045am    B- Covid + nausea and vomiting AND DKA    A- Brief systems assessment: Initial assessment:  HTN, md notified=monitor. Patient arouses to stimuli otherwise snoring, somnolent .  On RA o2 sats above 92%, LS clear to diminished.  Cap refil greater than 3 in all extremities.  Feet cool and pale and patient reports +tingling which is baseline.  Initiated insulin gtt and IVF per orders.    R- Transfer to ICU per physician orders. Continue to monitor pt and update physician as needed.       Code status: Full Code  Skin: intact  Fall Risk: Yes- Department fall risk interventions implemented.  Isolation and Signage: Covid Precautions - Airborne and Contact  Medication drips upon transfer: IVF changed to NS

## 2021-08-15 NOTE — H&P
[facility]    History and Physical - Hospitalist Service       Date of Admission:  8/14/2021    Assessment & Plan      Marvin Horvath is a 60 year old male admitted on 8/14/2021 for severe nausea and vomiting in setting of COVID infection.  Hyperglycemia.    Severe nausea and vomiting in setting of COVID infection.  Admit the patient to medical telemetry under observation.  Note GI symptoms likely related to COVID infection.  Treat conservatively with IVF, IV antiemetics and advance diet as tolerated.    COVID infection.   Note the patient is not hypoxemic at this time.  No further treated warranted.  Monitor for now for any sign of hypoxemia.    Dehydration.  IVF.      Hyperglycemia.in setting of NIDDM.  Hold Metformin and provide  subcutaneous insulin and monitor glucose    Elevated troponin. Mild.  Denies any chest pain . Possible related to COVID infection.  Trend for now.  EKG shows no acute ST/T wave changes.      DVT PPx lovenox    Code status full code    Disposition home in 1-2 days           The patient's care was discussed with the Bedside Nurse.    Luan Rhodes MD  [facility]  Securely message with the Vocera Web Console (learn more here)  Text page via [facility] Paging/Directory      ______________________________________________________________________    Chief Complaint   Shortness of breath, nausea and vomiting    History is obtained from the patient    History of Present Illness   Marvin Horvath is a 60 year old male with past medical history of NIDDM, GERD and HLD presents with shortness of breath, nausea and vomiting.  Per the patients report, the patient started to have generalized body aches and subjective fever and chills for the last week.  The patient states over the last few days, the patient started to also have some mild shortness of breath.  Today, the patient develops increasing nausea and vomiting and unable to take in  adequate oral fluid.  The patient otherwise denies any chest pain, diarrhea, dysuria or headache.      In our ER, the patient was hemodynamically stable and was saturating well on room air.  However the patient does have elevated glucose in 300s without sign of DKA.  The patient has ongoing nausea and vomiting despite IV antiemetics and hence our ER physician requested for admission for symptom control and hydration.    Review of Systems    The 10 point Review of Systems is negative other than noted in the HPI or here.     Past Medical History    I have reviewed this patient's medical history and updated it with pertinent information if needed.   History reviewed. No pertinent past medical history.   GERD, HLD and NIDDM    Past Surgical History   I have reviewed this patient's surgical history and updated it with pertinent information if needed.  History reviewed. No pertinent surgical history.    Social History   I have reviewed this patient's social history and updated it with pertinent information if needed.  Social History     Tobacco Use     Smoking status: Never Smoker     Smokeless tobacco: Never Used   Substance Use Topics     Alcohol use: No     Drug use: No       Family History   DM and HTN    Prior to Admission Medications   Prior to Admission Medications   Prescriptions Last Dose Informant Patient Reported? Taking?   aspirin (ASA) 81 MG tablet 8/14/2021 at 1100  Yes Yes   Sig: Take 1 tablet (81 mg) by mouth daily   blood glucose monitoring (NO BRAND SPECIFIED) test strip   No No   Sig: Use to test blood sugars 1 times daily or as directed   glipiZIDE (GLUCOTROL XL) 5 MG 24 hr tablet   No No   Sig: TAKE 1 TABLET BY MOUTH AT  BREAKFAST AND 2 TABLETS AT DINNER- OVERDUE FOR APPOINTMENT, PLEASE SCHEDULE   metFORMIN (GLUCOPHAGE) 1000 MG tablet 8/14/2021 at 1100  No Yes   Sig: TAKE ONE TABLET BY MOUTH TWICE A DAY WITH MEALS   omeprazole (PRILOSEC) 20 MG DR capsule   No No   Sig: Take 1 capsule (20 mg) by mouth  daily   Patient not taking: Reported on 4/6/2020   ondansetron (ZOFRAN-ODT) 4 MG ODT tab   No No   Sig: Take 1 tablet (4 mg) by mouth every 8 hours as needed for nausea   pravastatin (PRAVACHOL) 20 MG tablet   No No   Sig: Take 1 tablet (20 mg) by mouth daily   Patient not taking: Reported on 4/6/2020   valACYclovir (VALTREX) 1000 mg tablet   No No   Sig: Take 1 tablet (1,000 mg) by mouth 3 times daily for 7 days      Facility-Administered Medications: None     Allergies   No Known Allergies    Physical Exam   Vital Signs: Temp: 99.3  F (37.4  C) Temp src: Oral BP: (!) 166/84 Pulse: 102   Resp: 29 SpO2: 96 % O2 Device: None (Room air)    Weight: 230 lbs 0 oz    GENERAL: The patient is not in any acute distressed. Awake and alert.  HEENT: Nonicteric sclerae, PERRLA, EOMI. Oropharynx clear. Moist mucous membranes. Conjunctivae appear well perfused.  HEART: Regular rate and rhythm without murmurs. No lower extremities edema.  LUNGS: Clear to auscultation bilaterally. No wheezing, crackles or rhonchi  ABDOMEN: Soft, positive bowel sounds, nontender.  SKIN: No rash, no excessive bruising, petechiae, or purpura.  NEUROLOGIC: AxO x 3.  Cranial nerves II-XII intact without motor/sensory deficit.    Data   Data reviewed today: I reviewed all medications, new labs and imaging results over the last 24 hours.     Recent Labs   Lab 08/15/21  0222 08/14/21  2304 08/14/21 2021 08/14/21  1706   WBC  --   --   --  6.8   HGB  --   --   --  15.9   MCV  --   --   --  84   PLT  --   --   --  178   NA  --   --   --  133   POTASSIUM  --   --   --  3.9   CHLORIDE  --   --   --  104   CO2  --   --   --  9*   BUN  --   --   --  22   CR  --   --   --  0.74   ANIONGAP  --   --   --  20*   CESAR  --   --   --  9.2   * 280* 324* 351*   ALBUMIN  --   --   --  3.2*   PROTTOTAL  --   --   --  7.9   BILITOTAL  --   --   --  0.5   ALKPHOS  --   --   --  41   ALT  --   --   --  22   AST  --   --   --  14   TROPONIN 0.113*  --   --  0.087*      Recent Results (from the past 24 hour(s))   XR Chest Port 1 View    Narrative    XR PORTABLE CHEST ONE VIEW   8/14/2021 5:34 PM     HISTORY: Short of breath    COMPARISON: Chest x-ray on 11/13/2019      Impression    IMPRESSION: PA and lateral views of the chest were obtained.  Cardiomediastinal silhouette is within normal limits. No suspicious  focal pulmonary opacities. No significant pleural effusion or  pneumothorax.    VIGNESH RAYMOND MD         SYSTEM ID:  [id number]   CT Chest Pulmonary Embolism w Contrast    Narrative    CT CHEST PULMONARY EMBOLISM WITH CONTRAST  8/14/2021 6:44 PM    HISTORY:  Tachycardia, short of breath, Covid-19 positive. Rule out  pulmonary embolism.      TECHNIQUE: Scans obtained from the apices through the diaphragm with  IV contrast. 85mL, Isovue 370 IV injected. Radiation dose for this  scan was reduced using automated exposure control, adjustment of the  mA and/or kV according to patient size, or iterative reconstruction  technique.    COMPARISON:  None available    FINDINGS:  Chest/mediastinum: No evidence of pulmonary embolism. No cardiomegaly  or significant pericardial effusion. Mild aneurysmal dilatation of the  ascending thoracic aorta measuring 4 cm in diameter. No significant  mediastinal or hilar lymphadenopathy.    Lungs and pleura: No pleural effusion or pneumothorax. Bibasilar and  subpleural predominant patchy ground glass pulmonary opacities,  worrisome for infection including atypical infection like COVID-19.    Upper abdomen: Diffuse hypoattenuation of the liver, likely due to  underlying hepatic steatosis. Moderate amount of stool in the  visualized colon.    Bones and soft tissue: Multilevel degenerative changes of the spine.  No suspicious osseous lesion.      Impression    IMPRESSION:   1. No evidence of pulmonary embolism.  2. Basilar and subpleural predominant patchy groundglass pulmonary  opacities, worrisome for infection including atypical infection  "like  COVID-19.  3. Significant hepatic steatosis.    VIGNESH RAYMOND MD         SYSTEM ID:  [email or web address]   Telemed statement : The patient was evaluated via telemedicine and was in agreement with the plan.     The nurse was at the bedside during the entire encounter which took place virtually from  [place] .     The patient was evaluated at  [facility]\"      "

## 2021-08-15 NOTE — PROGRESS NOTES
S- Transfer to  from M/S 250.    B- nausea/vomiting; COVID +    A- Brief systems assessment: Temp: 97.9  F (36.6  C) Temp src: Oral BP: (!) 179/90 Pulse: 92   Resp: 23 SpO2: 97 % O2 Device: None (Room air)     Weight: 104.3 kg (230 lb)  +.  Ketones +.  Pt denies pain.  Purulent yellow drainage from left ear.  Lungs CTA.  Pt is breathless, tachypneic.  Pt dry heaves after position changes, improved with prn zofran given.  LBM 4-5 days ago per patient report.    R- Transfer to /S per physician orders. Continue to monitor pt and update physician as needed.      Code status: Full Code  Skin: intact  Fall Risk: Yes  Isolation and Signage: Special COVID precautions  Medication drips upon transfer: IV SL  Blue Bin checked and medications transfer out with patient: Yes

## 2021-08-15 NOTE — SIGNIFICANT EVENT
Significant Event Note    Time of event: 9:26 AM August 15, 2021    Description of event:  ABG shows critical pH of 7.08 with pCO2 of 14 and Bicarb of 4.  Anion gap is 19 on AM labs and 20 on labs from last evening.  Classic for anion gap metabolic acidosis with full respiratory compensation.  Lactic acid was negative yesterday, not rechecked today.  Blood sugars have been in the 300 range but no ketones have been performed in the ED or since admission.  No JACQUI present, patient denies excess tylenol or ASA use.     Assessment:  Critical anion gap metabolic acidosis with full respiratory compensation.  Likely causes are diabetic ketoacidosis vs fasting ketoacidosis or less likely lactic acidosis, alcohol ingestion acidosis, medication consumption.     Plan:  Will move patient to intermediate overflow status in the ICU immediately.    Will have Lactic acid, Ketones, salicylate level, acetaminophen level added on to the labs previously drawn to help differentiate etiology of anion gap acidosis.  Will perform osmolality, phosphorous, magnesium on previously drawn labs   Anticipate DKA at this time - nursing will recheck glucose again on arrival to ICU.      Karen Torres MD

## 2021-08-15 NOTE — SIGNIFICANT EVENT
Significant Event Note    Time of event: 9:54 AM August 15, 2021    Description of event:  Ketones have returned elevated at 3.0, confirming ketoacidosis.  Lactic acid normal.  Salicylate and acetaminophen level not detectable.  Osmolality pending.  Patient just arrived into the ICU and re-evaluated - Patient continues to be tachypnic but O2 sats in the upper 90s, appears critically ill and unchanged from assessment earlier today.    Plan:  Admit to ICU status, DKA protocol will be initiated with fluid bolus, insulin infusion, frequent labs.    Patient did receive Lantus 30 units last evening and 30 units this morning and is not normally on insulin - nursing aware and will follow algorithm closely and inform if significant drop in blood sugars occur above expected.    Will continue dexamethasone for COVID 19 positive status in patient with COVID pneumonia on CT and respiratory issues - will increase his blood sugars but at this time benefit if felt to outweigh the risk.    Discussed with: patient and patient's sister Aliza    62 minutes has been spent so far today on management of this critically ill patient.      Karen Torres MD

## 2021-08-15 NOTE — PROVIDER NOTIFICATION
08/15/21 1627   Critical Test Results/Notification   Critical Lab Result (Lab Name and Value) Ketone 4.0   What Time Did The Lab Notify You? 1627   What Provider Did You Notify? Brian   Was the Provider Notified Within 30 Min?  Yes

## 2021-08-15 NOTE — PROGRESS NOTES
S-(situation): Patient registered to Observation. Patient arrived to room 250 via Cart from ED    B-(background): COVID, N&V    A-(assessment): BP (!) 161/85 (BP Location: Right arm)   Pulse 98   Temp 99  F (37.2  C) (Oral)   Resp 20   Wt 104.3 kg (230 lb)   SpO2 95%   BMI 33.24 kg/m  . Hyperglycemic. Skin intact. Ambulating SBA, weakness. Nausea and vomiting continue, compazine given. On RA, LSC, diminished.       R-(recommendations): Orders and observation goals reviewed with Pt    Nursing Observation criteria listed below was met:    Skin issues/needs documented:NA  Isolation needs addressed and Signage up: Yes  Fall Prevention: Education given and documented: Yes  Education Assessment documented:Yes  Admission Education Documented: Yes  New medication patient education completed and documented (Possible Side Effects of Common Medications handout): Yes  OBS video/handout Reviewed & Documented: Yes  Allergies Reviewed: Yes  Medication Reconciliation Complete: Yes  Home medications if not able to send immediately home with family stored here: NA  Reminder note placed in discharge instructions of home meds: NA  Patient has discharge needs (If yes, please explain): No  Patient discharge preferences addressed and charted on white board:  Yes

## 2021-08-15 NOTE — PLAN OF CARE
S-(situation): End of shift note    B-(background): COVID w/ N&V    A-(assessment): BP (!) 165/90 (BP Location: Right arm)   Pulse 97   Temp 99.2  F (37.3  C) (Oral)   Resp 20   Wt 104.3 kg (230 lb)   SpO2 95%   BMI 33.24 kg/m  . Pt remains hypertensive. Lung sounds diminished, on RA at this time. Experiences significant SOB with ambulating. Coughing frequently, dry. A&O. Intermittent nausea and vomiting, compazine given x1. Pt feeling miserable and complaining of throat pain. Able to make needs known and uses call light appropriately.     R-(recommendations): Continue to monitor per care plan.

## 2021-08-15 NOTE — PROGRESS NOTES
S-(situation): Patient changed to inpatient status    B-(background): Patient status change due to observation goals not being met.     A-(assessment): Pt is tachypneic and breathless, BRANCH.  Blood sugars 300 range.  Pt dry heaving after position changes.  Afebrile.  O2 sats WDL on RA.  Pt c/o chronic generalized body aches, declines need for pain medication.    R-(recommendations):  Will monitor patient per MD orders.       Inpatient nursing criteria listed below were met:    Adult Profile completedYes  Health care directives status obtained and documented: Yes  VTE prophylaxis orders: Lovenox  (FYI: Asprin is not an approved anticoagulation for DVT prophylaxis)  SCD's Documented: NA  Vaccine assessment done and vaccine ordered if needed. Not Applicable  My Chart patient sign up addressed and documented: No  Care Plan initiated: Yes  Discharge planning review completed (admission navigator) No

## 2021-08-15 NOTE — PLAN OF CARE
"S-(situation): note    B-(background): DKA. +Covid    A-(assessment): VSS, Max temp 99.9. On RA LS diminished to clear.  Patient reports \"dry heaving\" to cough out phlegm.  Denies nausea.  Cap refil 4-5 seconds. Venous Blood Gas  Recent Labs   Lab 08/15/21  1815 08/15/21  1611 08/15/21  1418 08/15/21  1212   PHV 7.26* 7.22* 7.19* 7.14*   PCO2V 32* 33* 30* 26*   PO2V 31 27 27 39   HCO3V 14* 13* 11* 9*   [place] -11.6* -13.1* -15.7* -18.5*   O2PER 21 21 21 21     Insulin gtt infusing in Alg 4 with blood sugars trending down, D5 IVF initiated when blood sugar dropped below 200 per protocol.  Continue to check Blood sugars every hour.    Date 08/15/21 0700 - 08/16/21 0659   Shift 1250-4234 0639-9513 9034-3367 24 Hour Total   INTAKE   P.O. 450 100  550   I.V. 644.98 661.88  1306.86   IV Piggyback 1100   1100   Shift Total(mL/kg) 2194.98(21.04) 761.88(7.3)  2956.86(28.34)   OUTPUT   Urine 1200 600  1800   Shift Total(mL/kg) 1200(11.5) 600(5.75)  1800(17.25)   Weight (kg) 104.33 104.33 104.33 104.33          R-(recommendations): monitor    "

## 2021-08-15 NOTE — PROGRESS NOTES
[facility]    Medicine Progress Note - Hospitalist Service       Date of Admission:  8/14/2021    Assessment & Plan    Patient is a 60-year-old gentleman with past medical history of uncontrolled noninsulin-dependent diabetes, reflux, hyperlipidemia who presented with a one week illness of SOB, nausea, vomiting and was found to be COVID positive with COVID 19 pneumonia discovered on CT chest with hyperglycemia and appear acutely ill with ongoing dry heaves in the ED.  Patient was oxygenating well in the mid to upper 90s but given his ongoing nausea and vomiting, tachypnea in the upper 20s and ill appearance, patient was registered to observation status, given decadron and IV fluids and monitored closely.  This morning patient continues to appear acutely ill with tachypnea in the upper 20-lower 30 range currently even at rest, appears very fatigued.  On review of chart, patient has a low bicarb and elevated anion gap last evening and again this morning but no further testing has been completed.  Will transition patient to inpatient status, perform blood gas now for further evaluation of possible anion gap metabolic acidosis.  Perform COVID lab panel including ferritin, LDH, INR, fibrinogen, CRP inflammation.  Reviewed criteria and patient is not a Remdesivir candidate.  Monitor patient closely and nursing will inform immediately if patient starts having oxygen supplementation needs. Re-evaluate after lab work is completed.      Principal Problem:    Respiratory distress    Assessment: Characterized by significant tachypnea and increased work of breathing but patient able to maintain saturations without oxygen supplementation.  Etiology is unclear as it could be secondary to COVID-19 pneumonia versus metabolic acidosis with respiratory compensation.    Plan: Proceed with plan as above and monitor patient's respiratory status closely    Active Problems:    COVID-19 virus infection;  Pneumonia due to 2019 novel coronavirus    Assessment: Positive Covid test on 8/14, pneumonia noted on CT scan of the lungs, approximate onset of illness 1 week ago    Plan: Continue with dexamethasone 6 mg daily for now, patient is not a candidate for remdesivir.  Continue with Lovenox 40 mg daily dosing based on current risk factors      Nausea and vomiting    Assessment: Patient continuing to need IV antiemetics to keep his symptoms under control, has ongoing dry heaves and minimal oral intake overnight    Plan: Continue as needed antiemetics, proceed with further work-up as above      Hyperglycemia    Assessment: Blood sugars have been in the 200-300 range with patient given Lantus 30 units last night and another 30 units this morning - not normally on insulin    Plan: Will need to monitor very closely for hypoglycemia going forward and consider holding sliding scale insulin if blood sugars are decreasing significantly      Dehydration    Assessment: Present at time of admission thought secondary to patient's inability to eat or drink for several days prior to admission    Plan: Continue with IV fluids at this time given concern for anion gap acidosis which could be starvation ketoacidosis versus DKA.  Will need to monitor respiratory status closely with fluid ongoing given his current Covid status.      Troponin level elevated    Assessment: Mildly present in the context of Covid infection    Plan: We will continue serial troponins and vital check every 48 hours once it stabilizes.  If patient does have chest pain or symptoms more concerning for acute coronary syndrome would reevaluate sooner.      Acute otitis media of left ear with perforated tympanic membrane    Assessment: Patient describes pressure in his left ear to lean up over the past few days and this morning he had a sudden release of the pressure and is now having copious purulent yellow drainage from his left ear.  Unable to visualize perforation  on exam secondary to the amount of pus present    Plan: Given clinical picture, will start patient on Augmentin twice daily for treatment of perforated otitis media      SIRS (systemic inflammatory response syndrome) vs sepsis    Assessment: Patient having persistent tachycardia, tachypnea.  Unclear if this is secondary to Covid infection or possible anion gap metabolic acidosis    Plan: Proceed with plan as outlined above to further characterize etiology of the symptoms      Type 2 diabetes mellitus with diabetic neuropathy (H)    Assessment: Patient on Metformin at baseline, does not normally routinely check his blood sugars.  Hemoglobin A1c has increased greatly from 8.6 in 2019 up to 12.8 when checked at this admission.  Patient has been given Lantus 30 units last evening and again this morning with blood sugars currently in the 200s    Plan: We will proceed with work-up as outlined above and monitor blood sugar closely going forward.  Patient will need a better management plan to improve his diabetic control as well as follow-up with his primary care provider to ensure improvement of his hemoglobin A1c going forward.      Hyperlipidemia LDL goal <100    Assessment: Previous history with most recent LDL from 2019 134 however patient is not currently on any medication therapy    Plan: We will recheck fasting lipid panel for further evaluation       Diet: Advance Diet as Tolerated: Clear Liquid Diet; 2604-2908 Calories: Moderate Consistent CHO (4-6 CHO units/meal)    DVT Prophylaxis: Enoxaparin (Lovenox) SQ  Garcia Catheter: Not present  Central Lines: None  Code Status: Full Code      Disposition Plan   Expected discharge: 2-4 days recommended to prior living arrangement once SIRS/Sepsis treated and respiratory distress improves, patient medically stable for discharge.     The patient's care was discussed with the Bedside Nurse and Patient.    Karen Torres MD  Hospitalist Service  [facility]  [facility]  Securely message with the [employer] Web Console (learn more here)  Text page via [employer] Paging/Directory      Clinically Significant Risk Factors Present on Admission              # Platelet Defect: home medication list includes an antiplatelet medication      ______________________________________________________________________    Interval History   Patient continues to feel very unwell with ongoing nausea, dry heaves, and sensation of shortness of breath without any improvement overnight.  He continues to need IV antiemetics.  O2 saturations continue to be in the mid to upper 90 range the patient has had ongoing increased work of breathing, especially with any activity effort.  He did also start having purulent discharge draining from his left ear upon awakening this morning as outlined above.  Did have persistent tachycardia overnight and all other vitals stable.  No additional new nursing concerns.    Data reviewed today: I reviewed all medications, new labs and imaging results over the last 24 hours.    Physical Exam   Vital Signs: Temp: 97.1  F (36.2  C) Temp src: Oral BP: (!) 156/84 Pulse: 91   Resp: 24 SpO2: 96 % O2 Device: None (Room air)    Weight: 230 lbs 0 oz  Constitutional: awake, alert, appears acutely distressed with RR currently in the upper 20s but patient sating 97% on RA, flushed, low energy  HEENT:  Patient has yellow purulent discharge coming out of his left ear canal.  Attempted to visualize the tympanic membrane but patient has narrowed canal that is completely filled with pus and cannot see the TM clearly.    Respiratory: increased work of breathing with tachypnea, labored breathing efforts but no retractions, clear to auscultation bilaterally, no crackles or wheezes, decreased breath sounds diffusely  Cardiovascular: regular rate and rhythm currently without murmur  GI: bowel sounds present, abdomen soft and non-tender  Skin: face appears slightly flushed but no  rashes or lesions noted  Musculoskeletal: no lower extremity pitting edema present  Neurologic: Awake, alert, oriented to name, place and time.  Mentation is slightly slow but answering questions appropriately     Data   Recent Labs   Lab 08/15/21  1036 08/15/21  1028 08/15/21  0824 08/15/21  0749 08/15/21  0618 08/15/21  0222 08/14/21  1706   WBC  --   --   --   --  6.6  --  6.8   HGB  --   --   --   --  15.2  --  15.9   MCV  --   --   --   --  86  --  84   PLT  --   --   --   --  182  --  178   INR  --   --  1.16*  --   --   --   --    NA  --  133  --   --  135  --  133   POTASSIUM  --  4.0  --   --  4.0  --  3.9   CHLORIDE  --  107  --   --  109  --  104   CO2  --  8*  --   --  7*  --  9*   BUN  --  21  --   --  21  --  22   CR  --  0.76  --   --  0.74  --  0.74   ANIONGAP  --  18*  --   --  19*  --  20*   CESAR  --  8.1*  --   --  8.5  --  9.2   * 402*  --  305* 317* 318* 351*   ALBUMIN  --   --   --   --   --   --  3.2*   PROTTOTAL  --   --   --   --   --   --  7.9   BILITOTAL  --   --   --   --   --   --  0.5   ALKPHOS  --   --   --   --   --   --  41   ALT  --   --   --   --   --   --  22   AST  --   --   --   --   --   --  14   TROPONIN  --   --   --   --  0.114* 0.113* 0.087*

## 2021-08-15 NOTE — PROGRESS NOTES
Pt is Covid+ but in ICU for DKA. Pt currently on room air and not SOA. RT not needed at this time. Will assist if needed for respiratory symptoms at RNs request.

## 2021-08-16 PROBLEM — E87.6 HYPOKALEMIA: Status: ACTIVE | Noted: 2021-08-16

## 2021-08-16 LAB
ANION GAP SERPL CALCULATED.3IONS-SCNC: 7 MMOL/L (ref 3–14)
ANION GAP SERPL CALCULATED.3IONS-SCNC: 7 MMOL/L (ref 3–14)
ANION GAP SERPL CALCULATED.3IONS-SCNC: 8 MMOL/L (ref 3–14)
BASE EXCESS BLDV CALC-SCNC: -2.2 MMOL/L (ref -7.7–1.9)
BASE EXCESS BLDV CALC-SCNC: -3.8 MMOL/L (ref -7.7–1.9)
BASE EXCESS BLDV CALC-SCNC: -4.6 MMOL/L (ref -7.7–1.9)
BASE EXCESS BLDV CALC-SCNC: -6.5 MMOL/L (ref -7.7–1.9)
BASE EXCESS BLDV CALC-SCNC: -7.3 MMOL/L (ref -7.7–1.9)
BUN SERPL-MCNC: 10 MG/DL (ref 7–30)
BUN SERPL-MCNC: 11 MG/DL (ref 7–30)
BUN SERPL-MCNC: 12 MG/DL (ref 7–30)
BUN SERPL-MCNC: 8 MG/DL (ref 7–30)
BUN SERPL-MCNC: 9 MG/DL (ref 7–30)
CALCIUM SERPL-MCNC: 7.8 MG/DL (ref 8.5–10.1)
CALCIUM SERPL-MCNC: 7.9 MG/DL (ref 8.5–10.1)
CALCIUM SERPL-MCNC: 8 MG/DL (ref 8.5–10.1)
CHLORIDE BLD-SCNC: 111 MMOL/L (ref 94–109)
CHLORIDE BLD-SCNC: 111 MMOL/L (ref 94–109)
CHLORIDE BLD-SCNC: 112 MMOL/L (ref 94–109)
CHLORIDE BLD-SCNC: 112 MMOL/L (ref 94–109)
CHLORIDE BLD-SCNC: 113 MMOL/L (ref 94–109)
CO2 SERPL-SCNC: 17 MMOL/L (ref 20–32)
CO2 SERPL-SCNC: 18 MMOL/L (ref 20–32)
CO2 SERPL-SCNC: 19 MMOL/L (ref 20–32)
CO2 SERPL-SCNC: 20 MMOL/L (ref 20–32)
CO2 SERPL-SCNC: 21 MMOL/L (ref 20–32)
CREAT SERPL-MCNC: 0.54 MG/DL (ref 0.66–1.25)
CREAT SERPL-MCNC: 0.55 MG/DL (ref 0.66–1.25)
CREAT SERPL-MCNC: 0.56 MG/DL (ref 0.66–1.25)
CREAT SERPL-MCNC: 0.6 MG/DL (ref 0.66–1.25)
CREAT SERPL-MCNC: 0.62 MG/DL (ref 0.66–1.25)
D DIMER PPP FEU-MCNC: 0.63 UG/ML FEU (ref 0–0.5)
ERYTHROCYTE [DISTWIDTH] IN BLOOD BY AUTOMATED COUNT: 13.5 % (ref 10–15)
FIBRINOGEN PPP-MCNC: 659 MG/DL (ref 170–490)
GFR SERPL CREATININE-BSD FRML MDRD: >90 ML/MIN/1.73M2
GLUCOSE BLD-MCNC: 158 MG/DL (ref 70–99)
GLUCOSE BLD-MCNC: 160 MG/DL (ref 70–99)
GLUCOSE BLD-MCNC: 166 MG/DL (ref 70–99)
GLUCOSE BLD-MCNC: 180 MG/DL (ref 70–99)
GLUCOSE BLD-MCNC: 184 MG/DL (ref 70–99)
GLUCOSE BLDC GLUCOMTR-MCNC: 138 MG/DL (ref 70–99)
GLUCOSE BLDC GLUCOMTR-MCNC: 146 MG/DL (ref 70–99)
GLUCOSE BLDC GLUCOMTR-MCNC: 157 MG/DL (ref 70–99)
GLUCOSE BLDC GLUCOMTR-MCNC: 157 MG/DL (ref 70–99)
GLUCOSE BLDC GLUCOMTR-MCNC: 159 MG/DL (ref 70–99)
GLUCOSE BLDC GLUCOMTR-MCNC: 159 MG/DL (ref 70–99)
GLUCOSE BLDC GLUCOMTR-MCNC: 160 MG/DL (ref 70–99)
GLUCOSE BLDC GLUCOMTR-MCNC: 164 MG/DL (ref 70–99)
GLUCOSE BLDC GLUCOMTR-MCNC: 164 MG/DL (ref 70–99)
GLUCOSE BLDC GLUCOMTR-MCNC: 166 MG/DL (ref 70–99)
GLUCOSE BLDC GLUCOMTR-MCNC: 166 MG/DL (ref 70–99)
GLUCOSE BLDC GLUCOMTR-MCNC: 167 MG/DL (ref 70–99)
GLUCOSE BLDC GLUCOMTR-MCNC: 167 MG/DL (ref 70–99)
GLUCOSE BLDC GLUCOMTR-MCNC: 169 MG/DL (ref 70–99)
GLUCOSE BLDC GLUCOMTR-MCNC: 169 MG/DL (ref 70–99)
GLUCOSE BLDC GLUCOMTR-MCNC: 170 MG/DL (ref 70–99)
GLUCOSE BLDC GLUCOMTR-MCNC: 172 MG/DL (ref 70–99)
GLUCOSE BLDC GLUCOMTR-MCNC: 173 MG/DL (ref 70–99)
GLUCOSE BLDC GLUCOMTR-MCNC: 175 MG/DL (ref 70–99)
GLUCOSE BLDC GLUCOMTR-MCNC: 176 MG/DL (ref 70–99)
GLUCOSE BLDC GLUCOMTR-MCNC: 179 MG/DL (ref 70–99)
GLUCOSE BLDC GLUCOMTR-MCNC: 181 MG/DL (ref 70–99)
GLUCOSE BLDC GLUCOMTR-MCNC: 186 MG/DL (ref 70–99)
GLUCOSE BLDC GLUCOMTR-MCNC: 223 MG/DL (ref 70–99)
HCO3 BLDV-SCNC: 17 MMOL/L (ref 21–28)
HCO3 BLDV-SCNC: 17 MMOL/L (ref 21–28)
HCO3 BLDV-SCNC: 18 MMOL/L (ref 21–28)
HCO3 BLDV-SCNC: 20 MMOL/L (ref 21–28)
HCO3 BLDV-SCNC: 21 MMOL/L (ref 21–28)
HCT VFR BLD AUTO: 40.7 % (ref 40–53)
HGB BLD-MCNC: 14.3 G/DL (ref 13.3–17.7)
HOLD SPECIMEN: NORMAL
INR PPP: 1.06 (ref 0.85–1.15)
KETONES BLD-SCNC: 0.2 MMOL/L (ref 0–0.6)
KETONES BLD-SCNC: 0.2 MMOL/L (ref 0–0.6)
KETONES BLD-SCNC: 0.6 MMOL/L (ref 0–0.6)
KETONES BLD-SCNC: 0.9 MMOL/L (ref 0–0.6)
LDH SERPL L TO P-CCNC: 277 U/L (ref 85–227)
MCH RBC QN AUTO: 28.4 PG (ref 26.5–33)
MCHC RBC AUTO-ENTMCNC: 35.1 G/DL (ref 31.5–36.5)
MCV RBC AUTO: 81 FL (ref 78–100)
O2/TOTAL GAS SETTING VFR VENT: 21 %
OSMOLALITY SERPL: 286 MMOL/KG (ref 275–295)
PCO2 BLDV: 28 MM HG (ref 40–50)
PCO2 BLDV: 28 MM HG (ref 40–50)
PCO2 BLDV: 30 MM HG (ref 40–50)
PCO2 BLDV: 32 MM HG (ref 40–50)
PCO2 BLDV: 33 MM HG (ref 40–50)
PH BLDV: 7.34 [PH] (ref 7.32–7.43)
PH BLDV: 7.39 [PH] (ref 7.32–7.43)
PH BLDV: 7.4 [PH] (ref 7.32–7.43)
PH BLDV: 7.43 [PH] (ref 7.32–7.43)
PH BLDV: 7.45 [PH] (ref 7.32–7.43)
PLATELET # BLD AUTO: 185 10E3/UL (ref 150–450)
PO2 BLDV: 28 MM HG (ref 25–47)
PO2 BLDV: 33 MM HG (ref 25–47)
PO2 BLDV: 46 MM HG (ref 25–47)
PO2 BLDV: 48 MM HG (ref 25–47)
PO2 BLDV: 54 MM HG (ref 25–47)
POTASSIUM BLD-SCNC: 2.9 MMOL/L (ref 3.4–5.3)
POTASSIUM BLD-SCNC: 3.2 MMOL/L (ref 3.4–5.3)
POTASSIUM BLD-SCNC: 3.3 MMOL/L (ref 3.4–5.3)
POTASSIUM BLD-SCNC: 3.3 MMOL/L (ref 3.4–5.3)
POTASSIUM BLD-SCNC: 3.4 MMOL/L (ref 3.4–5.3)
POTASSIUM BLD-SCNC: 3.7 MMOL/L (ref 3.4–5.3)
POTASSIUM BLD-SCNC: 3.7 MMOL/L (ref 3.4–5.3)
RBC # BLD AUTO: 5.03 10E6/UL (ref 4.4–5.9)
SODIUM SERPL-SCNC: 137 MMOL/L (ref 133–144)
SODIUM SERPL-SCNC: 137 MMOL/L (ref 133–144)
SODIUM SERPL-SCNC: 138 MMOL/L (ref 133–144)
SODIUM SERPL-SCNC: 139 MMOL/L (ref 133–144)
SODIUM SERPL-SCNC: 141 MMOL/L (ref 133–144)
TROPONIN I SERPL-MCNC: 0.18 UG/L (ref 0–0.04)
WBC # BLD AUTO: 7.7 10E3/UL (ref 4–11)

## 2021-08-16 PROCEDURE — 80048 BASIC METABOLIC PNL TOTAL CA: CPT | Performed by: FAMILY MEDICINE

## 2021-08-16 PROCEDURE — 200N000001 HC R&B ICU

## 2021-08-16 PROCEDURE — 250N000011 HC RX IP 250 OP 636: Performed by: FAMILY MEDICINE

## 2021-08-16 PROCEDURE — 82803 BLOOD GASES ANY COMBINATION: CPT | Performed by: FAMILY MEDICINE

## 2021-08-16 PROCEDURE — 85384 FIBRINOGEN ACTIVITY: CPT | Performed by: FAMILY MEDICINE

## 2021-08-16 PROCEDURE — 99233 SBSQ HOSP IP/OBS HIGH 50: CPT | Performed by: FAMILY MEDICINE

## 2021-08-16 PROCEDURE — 84484 ASSAY OF TROPONIN QUANT: CPT | Performed by: FAMILY MEDICINE

## 2021-08-16 PROCEDURE — 36415 COLL VENOUS BLD VENIPUNCTURE: CPT | Performed by: FAMILY MEDICINE

## 2021-08-16 PROCEDURE — 258N000003 HC RX IP 258 OP 636: Performed by: FAMILY MEDICINE

## 2021-08-16 PROCEDURE — 85014 HEMATOCRIT: CPT | Performed by: FAMILY MEDICINE

## 2021-08-16 PROCEDURE — 82010 KETONE BODYS QUAN: CPT | Performed by: FAMILY MEDICINE

## 2021-08-16 PROCEDURE — 250N000009 HC RX 250: Performed by: FAMILY MEDICINE

## 2021-08-16 PROCEDURE — 250N000013 HC RX MED GY IP 250 OP 250 PS 637: Performed by: FAMILY MEDICINE

## 2021-08-16 PROCEDURE — 85610 PROTHROMBIN TIME: CPT | Performed by: FAMILY MEDICINE

## 2021-08-16 PROCEDURE — 250N000012 HC RX MED GY IP 250 OP 636 PS 637: Performed by: FAMILY MEDICINE

## 2021-08-16 PROCEDURE — 85379 FIBRIN DEGRADATION QUANT: CPT | Performed by: FAMILY MEDICINE

## 2021-08-16 PROCEDURE — XW043E5 INTRODUCTION OF REMDESIVIR ANTI-INFECTIVE INTO CENTRAL VEIN, PERCUTANEOUS APPROACH, NEW TECHNOLOGY GROUP 5: ICD-10-PCS | Performed by: FAMILY MEDICINE

## 2021-08-16 PROCEDURE — 83930 ASSAY OF BLOOD OSMOLALITY: CPT | Performed by: FAMILY MEDICINE

## 2021-08-16 PROCEDURE — 83615 LACTATE (LD) (LDH) ENZYME: CPT | Performed by: FAMILY MEDICINE

## 2021-08-16 RX ORDER — POTASSIUM CHLORIDE 1500 MG/1
40 TABLET, EXTENDED RELEASE ORAL ONCE
Status: DISCONTINUED | OUTPATIENT
Start: 2021-08-16 | End: 2021-08-17

## 2021-08-16 RX ORDER — POTASSIUM CHLORIDE 1500 MG/1
20 TABLET, EXTENDED RELEASE ORAL ONCE
Status: COMPLETED | OUTPATIENT
Start: 2021-08-16 | End: 2021-08-16

## 2021-08-16 RX ORDER — POTASSIUM CHLORIDE 1500 MG/1
40 TABLET, EXTENDED RELEASE ORAL ONCE
Status: COMPLETED | OUTPATIENT
Start: 2021-08-16 | End: 2021-08-16

## 2021-08-16 RX ADMIN — SODIUM CHLORIDE: 9 INJECTION, SOLUTION INTRAVENOUS at 15:08

## 2021-08-16 RX ADMIN — POTASSIUM CHLORIDE 40 MEQ: 1500 TABLET, EXTENDED RELEASE ORAL at 13:14

## 2021-08-16 RX ADMIN — ASPIRIN 81 MG: 81 TABLET, DELAYED RELEASE ORAL at 08:29

## 2021-08-16 RX ADMIN — POTASSIUM CHLORIDE 40 MEQ: 1500 TABLET, EXTENDED RELEASE ORAL at 06:17

## 2021-08-16 RX ADMIN — POTASSIUM CHLORIDE, DEXTROSE MONOHYDRATE AND SODIUM CHLORIDE: 150; 5; 450 INJECTION, SOLUTION INTRAVENOUS at 16:53

## 2021-08-16 RX ADMIN — ENOXAPARIN SODIUM 50 MG: 60 INJECTION SUBCUTANEOUS at 20:20

## 2021-08-16 RX ADMIN — POTASSIUM CHLORIDE, DEXTROSE MONOHYDRATE AND SODIUM CHLORIDE: 150; 5; 450 INJECTION, SOLUTION INTRAVENOUS at 00:03

## 2021-08-16 RX ADMIN — DEXAMETHASONE 6 MG: 2 TABLET ORAL at 13:14

## 2021-08-16 RX ADMIN — SODIUM CHLORIDE 8 UNITS/HR: 9 INJECTION, SOLUTION INTRAVENOUS at 07:06

## 2021-08-16 RX ADMIN — SODIUM CHLORIDE 50 ML: 9 INJECTION, SOLUTION INTRAVENOUS at 13:13

## 2021-08-16 RX ADMIN — ENOXAPARIN SODIUM 50 MG: 60 INJECTION SUBCUTANEOUS at 08:29

## 2021-08-16 RX ADMIN — POTASSIUM CHLORIDE 20 MEQ: 1500 TABLET, EXTENDED RELEASE ORAL at 08:29

## 2021-08-16 RX ADMIN — INSULIN ASPART 4 UNITS: 100 INJECTION, SOLUTION INTRAVENOUS; SUBCUTANEOUS at 16:58

## 2021-08-16 RX ADMIN — SODIUM CHLORIDE 6 UNITS/HR: 9 INJECTION, SOLUTION INTRAVENOUS at 21:26

## 2021-08-16 RX ADMIN — AMOXICILLIN AND CLAVULANATE POTASSIUM 1 TABLET: 875; 125 TABLET, FILM COATED ORAL at 20:20

## 2021-08-16 RX ADMIN — POTASSIUM CHLORIDE, DEXTROSE MONOHYDRATE AND SODIUM CHLORIDE: 150; 5; 450 INJECTION, SOLUTION INTRAVENOUS at 08:40

## 2021-08-16 RX ADMIN — ONDANSETRON 4 MG: 2 INJECTION INTRAMUSCULAR; INTRAVENOUS at 05:06

## 2021-08-16 RX ADMIN — AMOXICILLIN AND CLAVULANATE POTASSIUM 1 TABLET: 875; 125 TABLET, FILM COATED ORAL at 08:29

## 2021-08-16 RX ADMIN — SODIUM CHLORIDE 8 UNITS/HR: 9 INJECTION, SOLUTION INTRAVENOUS at 00:04

## 2021-08-16 RX ADMIN — REMDESIVIR 200 MG: 100 INJECTION, POWDER, LYOPHILIZED, FOR SOLUTION INTRAVENOUS at 13:12

## 2021-08-16 ASSESSMENT — ACTIVITIES OF DAILY LIVING (ADL)
ADLS_ACUITY_SCORE: 12

## 2021-08-16 ASSESSMENT — MIFFLIN-ST. JEOR: SCORE: 1855.47

## 2021-08-16 NOTE — PLAN OF CARE
A/Ox4. VSS. Low grade temp of 99.5 this afternoon. Remains on RA. Lung sounds clear but diminished. This morning reported nausea with movement, but able to tolerate clear liquids this evening. Insulin drip continues to infuse per orders. Blood sugars ranging from 130s-220s, see results. Potassium being replaced per protocol throughout the day. Up to the side of bed with stand by assist to use the urinal. Denies any pain. Will continue to monitor.

## 2021-08-16 NOTE — PROVIDER NOTIFICATION
08/15/21 1627   Critical Test Results/Notification   Critical Lab Result (Lab Name and Value) Troponin 0.179   What Time Did The Lab Notify You? 0645   What Provider Did You Notify? Landy   Was the Provider Notified Within 30 Min?  Yes

## 2021-08-16 NOTE — PROGRESS NOTES
"CLINICAL NUTRITION SERVICES - ASSESSMENT NOTE     Nutrition Prescription    RECOMMENDATIONS FOR MDs/PROVIDERS TO ORDER:  1. Advance diet, as tolerated and appropriate, per team.    Malnutrition Status:    Unable to determine due to lack of NFPA due to patient COVID +    Recommendations already ordered by Registered Dietitian (RD):  None at this time.     Future/Additional Recommendations:  1. Monitor need for ONS if/when diet advances  2. Monitor weight trends   3. Monitor blood glucose levels during admission and need for diabetes diet education prior to discharge      REASON FOR ASSESSMENT  Marvin Horvath is a/an 60 year old male assessed by the dietitian for Admission Nutrition Risk Screen for positive weight loss of 14-23 lbs    NUTRITION HISTORY  --PMH: history of T2DM, hyperlipidemia, and GERD. Admitted to ED on 8/14/2021 for severe nausea and vomiting in setting of COVID infection.  Hyperglycemia in setting of NIDDM.  --Per H&P: \"He did have diarrhea earlier in the week, but none in the last couple days.  He has been nauseated and vomiting and retching.  Reports has no appetite and unable to eat for the last 2 days.\"  --Patient on Metformin at baseline, does not normally routinely check his blood sugars.  Hemoglobin A1c has increased greatly from 8.6 in 2019 up to 12.8 when checked at this admission  --Patient last seen by OP RD for diabetes education on 6/24/2020.    Per Patient Recall:   --States he has noticed a decrease in appetite over the last two weeks. A few days PTA, pt states he was eating very minimal but was able to tolerated fruit and eggs.     CURRENT NUTRITION ORDERS  Diet: NPO  Intake/Tolerance: Tolerating sips of water and ice chips. Pt previously on clear liquid diet, changed to NPO 8/15. Per patient, states he was given Jello, apple juice, and an orange ice that he tolerated well. States that in general he does not have much of an appetite and food does not sound good.     LABS  Reviewed " "    MEDICATIONS  Insulin GTT   D5/0.45NS w/ 20 mEq KCL    ANTHROPOMETRICS  Height: 175.3 cm (5' 9\")  Most Recent Weight: 104.5 kg (230 lb 6.4 oz)    IBW: 72.7 kg  BMI: Obesity Grade I BMI 30-34.9  Weight History: Patient states a UBW of 250 lbs and has noticed weight loss over the last 2 weeks when he began feeling ill.   Wt Readings from Last 10 Encounters:   08/16/21 104.5 kg (230 lb 6.4 oz)   04/06/20 103 kg (227 lb)   11/13/19 118.4 kg (261 lb)   05/01/19 109.3 kg (241 lb)   01/23/19 112.9 kg (249 lb)   10/24/18 104.1 kg (229 lb 9.6 oz)       Dosing Weight: 81 kg (adjusted)     ASSESSED NUTRITION NEEDS  Estimated Energy Needs: 3745-3034 kcals/day (25 - 30 kcals/kg)  Justification: Maintenance  Estimated Protein Needs: 81-97 grams protein/day (1 - 1.2 grams of pro/kg)  Justification: Maintenance  Estimated Fluid Needs: 1 mL/kcal   Justification: Per provider pending fluid status    PHYSICAL FINDINGS  See malnutrition section below.    MALNUTRITION  % Intake: </= 50% for >/= 5 days (severe)  % Weight Loss: Unable to assess - Limited weight history data. Per patient, states a ~20 lb weight loss in 2 weeks.  Subcutaneous Fat Loss: Unable to assess  Muscle Loss: Unable to assess  Fluid Accumulation/Edema: None noted  Malnutrition Diagnosis: Unable to determine due to lack of NFPA due to patient COVID +    NUTRITION DIAGNOSIS  Inadequate oral intake related to acute illness, decreased appetite, nausea as evidenced by pt recall, very limited oral intake for >/= 5 days    INTERVENTIONS  Implementation  Nutrition Education: Not appropriate at this time due to patient condition. RD to follow and to provide education as appropriate.    Collaboration with other providers - Interdisciplinary Rounds    Goals  Diet adv v nutrition support within 2-3 days.     Monitoring/Evaluation  Progress toward goals will be monitored and evaluated per protocol.    Shin Beasley RDN, LD  Clinical Dietitian   Office: [phone]  Weekend " Pager: [phone]

## 2021-08-16 NOTE — PROGRESS NOTES
Spitting up thick, white phlegm. Frequent bronchospastic coughing. Some tachypnea with exertion, does not desaturate. Currently on room air. AO4. Hoarseness resolved. Fluent speech. Currently on insulin gtt (alg 4) and D5/0.45NS w/ 20 mEq KCL. K 3.2, replacement protocol ordered. Tolerating sips of water w/ meds and ice chips. Denies pain. x1 zofran for nausea which was effective.

## 2021-08-16 NOTE — PROVIDER NOTIFICATION
Notified MD at 0311 AM regarding K 3.2, currently on d5 .45NS w/ 20 KCL and insulin gtt. Do you want to replace K?.      Spoke with: Dr Bill    Orders were obtained.    Comments: K replacement protocol ordered

## 2021-08-16 NOTE — PROGRESS NOTES
[facility]    Medicine Progress Note - Hospitalist Service       Date of Admission:  8/14/2021    Assessment & Plan           Patient is a 60-year-old gentleman with past medical history of uncontrolled noninsulin-dependent type 2 diabetes, reflux, hyperlipidemia who presented with a 1 week history of shortness of breath, nausea, vomiting was found to have COVID-19 pneumonia in addition to diabetic ketoacidosis and is currently admitted in the ICU for DKA management.  He has had improvement of his DKA labs but with ongoing mildly compensated acidosis with minimal oral intake and ongoing issues with nausea.  He continues to need insulin infusion until he is able to tolerate clears adequately.  His respiratory symptoms have remained overall stable with no oxygen supplementation needs    Principal Problem:    DKA (diabetic ketoacidoses) (H)    Assessment: Thought likely secondary to COVID-19 infection causing nausea/vomiting and minimal oral intake in the context of uncontrolled underlying diabetes.  Resolving with standard management    Plan: Continue with DKA protocol with ongoing insulin drip and dextrose containing IV fluids at this time until VBG fully normalizes and patient is taking clears adequately.    Active Problems:    Pneumonia due to 2019 novel coronavirus; COVID-19 virus infection    Assessment: Positive Covid test at time of admission with pneumonia noted on CT scan classic for Covid etiology    Plan: Patient started on remdesivir, continued dexamethasone, Lovenox for DVT prophylaxis.  Will monitor patient's respiratory status closely as well as daily Covid labs.  Patient is high risk for respiratory worsening secondary to IV fluids needed for DKA as above and nursing staff to inform if O2 supplementation needs to be placed      Respiratory distress    Assessment: Thought multifactorial with patient having significant improvement in his work of breathing and degree of  tachypnea as DKA improves but continues to have some tachypnea present, especially with activity which is likely secondary to his COVID-19 pneumonia     Plan: proceed with treatment of both of these conditions as above      Nausea and vomiting    Assessment: May be second to COVID-19 infection versus DKA or combination of both.  Continues to be present and patient is not tolerating more than ice chips at this time    Plan: Continue with as needed antiemetics, nursing staff will provide clear liquids as tolerated by the patient      Hypokalemia    Assessment: Not visibly present at time of admission but likely was artificially elevated secondary to DKA.  Patient has been started on potassium supplementation in addition to maintenance potassium in his IV fluids    Plan: Continue to supplement as appropriate      Dehydration    Assessment: Present at time of admission likely secondary to DKA and minimal oral intake in the week leading up to this hospitalization.  Now resolved following fluid resuscitation    Plan: Continue with current IV fluids for ongoing DKA management and monitor for ongoing resolution      Troponin level elevated    Assessment: Troponin mildly elevated at 0.087 at time of admission and has increased up to 0.179 this morning but EKG is normal and patient is having no chest pain or other symptoms concerning for cardiac etiology.  May be demand ischemia from DKA versus Covid infection    Plan: We will recheck troponin again tomorrow and if it continues to elevate despite DKA improvement could consider echocardiogram to further evaluate cardiac status.      Acute otitis media of left ear with perforated tympanic membrane    Assessment: Patient began having purulent pus draining from his left ear on 8/15 with history of significant ear pressure and discomfort in the days leading up to this with resolution of the pressure and discomfort once perforation occurred.  Patient has been started on Augmentin     Plan: Continue Augmentin for total 10-day completion course      SIRS (systemic inflammatory response syndrome) vs sepsis    Assessment: Secondary to DKA versus Covid infection, symptoms are improving    Plan: Continue with treatment of both conditions as outlined above      Type 2 diabetes mellitus with diabetic neuropathy (H)    Assessment: Hemoglobin A1c is 12.8, indicating overall poor control.  Currently on insulin drip    Plan: We will continue with insulin drip per DKA protocol.  If patient is starting to take in some clear liquids, will cover with 1 unit per 15 g of carb coverage and watch blood sugars closely.      Hyperlipidemia LDL goal <100    Assessment: LDL checked during this hospital stay and is 98, patient is not on any statin therapy    Plan: Routine outpatient follow-up     Diet: NPO for Medical/Clinical Reasons Except for: Ice Chips, Meds    DVT Prophylaxis: Enoxaparin (Lovenox) SQ  Garcia Catheter: Not present  Central Lines: None  Code Status: Full Code      Disposition Plan   Expected discharge: 3-5 days recommended to prior living arrangement once SIRS/Sepsis treated and patient tolerating adequate oral intake, blood sugars well controlled, COVID symptoms not worsening.     The patient's care was discussed with the Bedside Nurse, Patient and Patient's Family.    Karen Torres MD  Hospitalist Service  [facility]  Securely message with the [employer] Web Console (learn more here)  Text page via [employer] Paging/Directory      Clinically Significant Risk Factors Present on Admission        # Platelet Defect: home medication list includes an antiplatelet medication      ______________________________________________________________________    Interval History   Patient has remained vitally stable overnight.  Tmax of 99.9.  Blood pressures in the 130-150 systolic range.  Continues to have tachypnea especially with activity but is notably improved from yesterday  with less work of breathing present.  Patient also appears overall clinically improved from yesterday but still very weak and tired.  DKA labs improving greatly.  Patient continues to have significant nausea and is needing IV antiemetics to help control his symptoms.  He is not able to take in more than ice chips.    Data reviewed today: I reviewed all medications, new labs and imaging results over the last 24 hours.    Physical Exam   Vital Signs: Temp: 99.4  F (37.4  C) Temp src: Axillary BP: 134/79 Pulse: 74   Resp: 15 SpO2: 94 % O2 Device: None (Room air)    Weight: 230 lbs 6.4 oz  Constitutional: awake, alert, cooperative, appears acutely ill but slightly improved from yesterday, and appears stated age  Respiratory: No increased work of breathing at rest but patient does develop mild tachypnea even with shifting positions in bed to allow for better exam.  Ongoing diminished breath sounds diffusely without crackles or wheezes  Cardiovascular: Regular rate and rhythm without murmur   GI: bowel sounds present, abdomen soft and nontender  Skin: no redness, warmth, or swelling and no rashes  Musculoskeletal: no lower extremity pitting edema present  Neurologic: Awake, alert, oriented to name, place and situaiton     Data   Recent Labs   Lab 08/16/21  0612 08/16/21  0533 08/16/21  0500 08/16/21  0213 08/15/21  2202 08/15/21  1028 08/15/21  0824 08/15/21  0749 08/15/21  0618 08/15/21  0222 08/14/21  1706 08/14/21  1706   WBC  --  7.7  --   --   --   --   --   --  6.6  --   --  6.8   HGB  --  14.3  --   --   --   --   --   --  15.2  --   --  15.9   MCV  --  81  --   --   --   --   --   --  86  --   --  84   PLT  --  185  --   --   --   --   --   --  182  --   --  178   INR  --  1.06  --   --   --   --  1.16*  --   --   --   --   --    NA  --  137  --  138 139  --   --    < > 135  --   --  133   POTASSIUM  --  2.9*  --  3.2* 3.4  --   --    < > 4.0  --   --  3.9   CHLORIDE  --  112*  --  112* 113*  --   --    < > 109   --   --  104   CO2  --  17*  --  18* 17*  --   --    < > 7*  --   --  9*   BUN  --  11  --  12 13  --   --    < > 21  --   --  22   CR  --  0.56*  --  0.62* 0.74  --   --    < > 0.74  --   --  0.74   ANIONGAP  --  8  --  8 9  --   --    < > 19*  --   --  20*   CESAR  --  7.8*  --  8.0* 7.9*  --   --    < > 8.5  --   --  9.2   * 180* 157* 184* 193*   < >  --    < > 317* 318*   < > 351*   ALBUMIN  --   --   --   --   --   --   --   --   --   --   --  3.2*   PROTTOTAL  --   --   --   --   --   --   --   --   --   --   --  7.9   BILITOTAL  --   --   --   --   --   --   --   --   --   --   --  0.5   ALKPHOS  --   --   --   --   --   --   --   --   --   --   --  41   ALT  --   --   --   --   --   --   --   --   --   --   --  22   AST  --   --   --   --   --   --   --   --   --   --   --  14   TROPONIN  --  0.179*  --   --   --   --   --   --  0.114* 0.113*  --  0.087*    < > = values in this interval not displayed.

## 2021-08-17 LAB
ANION GAP SERPL CALCULATED.3IONS-SCNC: 6 MMOL/L (ref 3–14)
BASE EXCESS BLDV CALC-SCNC: 0.7 MMOL/L (ref -7.7–1.9)
BUN SERPL-MCNC: 7 MG/DL (ref 7–30)
CALCIUM SERPL-MCNC: 7.8 MG/DL (ref 8.5–10.1)
CHLORIDE BLD-SCNC: 111 MMOL/L (ref 94–109)
CO2 SERPL-SCNC: 23 MMOL/L (ref 20–32)
CREAT SERPL-MCNC: 0.54 MG/DL (ref 0.66–1.25)
D DIMER PPP FEU-MCNC: 0.58 UG/ML FEU (ref 0–0.5)
ERYTHROCYTE [DISTWIDTH] IN BLOOD BY AUTOMATED COUNT: 13.3 % (ref 10–15)
FIBRINOGEN PPP-MCNC: 618 MG/DL (ref 170–490)
GFR SERPL CREATININE-BSD FRML MDRD: >90 ML/MIN/1.73M2
GLUCOSE BLD-MCNC: 148 MG/DL (ref 70–99)
GLUCOSE BLDC GLUCOMTR-MCNC: 133 MG/DL (ref 70–99)
GLUCOSE BLDC GLUCOMTR-MCNC: 139 MG/DL (ref 70–99)
GLUCOSE BLDC GLUCOMTR-MCNC: 139 MG/DL (ref 70–99)
GLUCOSE BLDC GLUCOMTR-MCNC: 143 MG/DL (ref 70–99)
GLUCOSE BLDC GLUCOMTR-MCNC: 151 MG/DL (ref 70–99)
GLUCOSE BLDC GLUCOMTR-MCNC: 152 MG/DL (ref 70–99)
GLUCOSE BLDC GLUCOMTR-MCNC: 153 MG/DL (ref 70–99)
GLUCOSE BLDC GLUCOMTR-MCNC: 164 MG/DL (ref 70–99)
GLUCOSE BLDC GLUCOMTR-MCNC: 167 MG/DL (ref 70–99)
GLUCOSE BLDC GLUCOMTR-MCNC: 168 MG/DL (ref 70–99)
GLUCOSE BLDC GLUCOMTR-MCNC: 171 MG/DL (ref 70–99)
GLUCOSE BLDC GLUCOMTR-MCNC: 181 MG/DL (ref 70–99)
GLUCOSE BLDC GLUCOMTR-MCNC: 196 MG/DL (ref 70–99)
GLUCOSE BLDC GLUCOMTR-MCNC: 205 MG/DL (ref 70–99)
GLUCOSE BLDC GLUCOMTR-MCNC: 313 MG/DL (ref 70–99)
GLUCOSE BLDC GLUCOMTR-MCNC: 387 MG/DL (ref 70–99)
HCO3 BLDV-SCNC: 24 MMOL/L (ref 21–28)
HCT VFR BLD AUTO: 39.4 % (ref 40–53)
HGB BLD-MCNC: 14 G/DL (ref 13.3–17.7)
INR PPP: 1.11 (ref 0.85–1.15)
LDH SERPL L TO P-CCNC: 283 U/L (ref 85–227)
MCH RBC QN AUTO: 28.3 PG (ref 26.5–33)
MCHC RBC AUTO-ENTMCNC: 35.5 G/DL (ref 31.5–36.5)
MCV RBC AUTO: 80 FL (ref 78–100)
O2/TOTAL GAS SETTING VFR VENT: 30 %
PCO2 BLDV: 34 MM HG (ref 40–50)
PH BLDV: 7.46 [PH] (ref 7.32–7.43)
PLATELET # BLD AUTO: 199 10E3/UL (ref 150–450)
PO2 BLDV: 48 MM HG (ref 25–47)
POTASSIUM BLD-SCNC: 3.2 MMOL/L (ref 3.4–5.3)
POTASSIUM BLD-SCNC: 3.3 MMOL/L (ref 3.4–5.3)
POTASSIUM BLD-SCNC: 4 MMOL/L (ref 3.4–5.3)
RBC # BLD AUTO: 4.94 10E6/UL (ref 4.4–5.9)
SODIUM SERPL-SCNC: 140 MMOL/L (ref 133–144)
TROPONIN I SERPL-MCNC: 0.08 UG/L (ref 0–0.04)
WBC # BLD AUTO: 6.4 10E3/UL (ref 4–11)

## 2021-08-17 PROCEDURE — 83615 LACTATE (LD) (LDH) ENZYME: CPT | Performed by: FAMILY MEDICINE

## 2021-08-17 PROCEDURE — 250N000012 HC RX MED GY IP 250 OP 636 PS 637: Performed by: INTERNAL MEDICINE

## 2021-08-17 PROCEDURE — 85027 COMPLETE CBC AUTOMATED: CPT | Performed by: FAMILY MEDICINE

## 2021-08-17 PROCEDURE — 250N000011 HC RX IP 250 OP 636: Performed by: INTERNAL MEDICINE

## 2021-08-17 PROCEDURE — 250N000013 HC RX MED GY IP 250 OP 250 PS 637: Performed by: INTERNAL MEDICINE

## 2021-08-17 PROCEDURE — 258N000003 HC RX IP 258 OP 636: Performed by: FAMILY MEDICINE

## 2021-08-17 PROCEDURE — 99233 SBSQ HOSP IP/OBS HIGH 50: CPT | Performed by: INTERNAL MEDICINE

## 2021-08-17 PROCEDURE — 250N000012 HC RX MED GY IP 250 OP 636 PS 637: Performed by: FAMILY MEDICINE

## 2021-08-17 PROCEDURE — 84132 ASSAY OF SERUM POTASSIUM: CPT | Performed by: FAMILY MEDICINE

## 2021-08-17 PROCEDURE — 85379 FIBRIN DEGRADATION QUANT: CPT | Performed by: FAMILY MEDICINE

## 2021-08-17 PROCEDURE — 120N000001 HC R&B MED SURG/OB

## 2021-08-17 PROCEDURE — 250N000013 HC RX MED GY IP 250 OP 250 PS 637: Performed by: FAMILY MEDICINE

## 2021-08-17 PROCEDURE — 84484 ASSAY OF TROPONIN QUANT: CPT | Performed by: FAMILY MEDICINE

## 2021-08-17 PROCEDURE — 258N000003 HC RX IP 258 OP 636: Performed by: INTERNAL MEDICINE

## 2021-08-17 PROCEDURE — 250N000011 HC RX IP 250 OP 636: Performed by: FAMILY MEDICINE

## 2021-08-17 PROCEDURE — 85384 FIBRINOGEN ACTIVITY: CPT | Performed by: FAMILY MEDICINE

## 2021-08-17 PROCEDURE — 80048 BASIC METABOLIC PNL TOTAL CA: CPT | Performed by: FAMILY MEDICINE

## 2021-08-17 PROCEDURE — 85610 PROTHROMBIN TIME: CPT | Performed by: FAMILY MEDICINE

## 2021-08-17 PROCEDURE — 82803 BLOOD GASES ANY COMBINATION: CPT | Performed by: FAMILY MEDICINE

## 2021-08-17 PROCEDURE — 250N000009 HC RX 250: Performed by: INTERNAL MEDICINE

## 2021-08-17 PROCEDURE — 36415 COLL VENOUS BLD VENIPUNCTURE: CPT | Performed by: FAMILY MEDICINE

## 2021-08-17 RX ORDER — POTASSIUM CHLORIDE 1500 MG/1
40 TABLET, EXTENDED RELEASE ORAL ONCE
Status: COMPLETED | OUTPATIENT
Start: 2021-08-17 | End: 2021-08-17

## 2021-08-17 RX ORDER — NICOTINE POLACRILEX 4 MG
15-30 LOZENGE BUCCAL
Status: DISCONTINUED | OUTPATIENT
Start: 2021-08-17 | End: 2021-08-21 | Stop reason: HOSPADM

## 2021-08-17 RX ORDER — SODIUM CHLORIDE 9 MG/ML
INJECTION, SOLUTION INTRAVENOUS CONTINUOUS
Status: DISCONTINUED | OUTPATIENT
Start: 2021-08-17 | End: 2021-08-18

## 2021-08-17 RX ORDER — DEXTROSE MONOHYDRATE 25 G/50ML
25-50 INJECTION, SOLUTION INTRAVENOUS
Status: DISCONTINUED | OUTPATIENT
Start: 2021-08-17 | End: 2021-08-21 | Stop reason: HOSPADM

## 2021-08-17 RX ADMIN — INSULIN GLARGINE 10 UNITS: 100 INJECTION, SOLUTION SUBCUTANEOUS at 12:32

## 2021-08-17 RX ADMIN — ENOXAPARIN SODIUM 50 MG: 60 INJECTION SUBCUTANEOUS at 08:30

## 2021-08-17 RX ADMIN — INSULIN ASPART 4 UNITS: 100 INJECTION, SOLUTION INTRAVENOUS; SUBCUTANEOUS at 08:40

## 2021-08-17 RX ADMIN — AMOXICILLIN AND CLAVULANATE POTASSIUM 1 TABLET: 875; 125 TABLET, FILM COATED ORAL at 08:29

## 2021-08-17 RX ADMIN — ASPIRIN 81 MG: 81 TABLET, DELAYED RELEASE ORAL at 08:29

## 2021-08-17 RX ADMIN — REMDESIVIR 100 MG: 100 INJECTION, POWDER, LYOPHILIZED, FOR SOLUTION INTRAVENOUS at 18:08

## 2021-08-17 RX ADMIN — AMOXICILLIN AND CLAVULANATE POTASSIUM 1 TABLET: 875; 125 TABLET, FILM COATED ORAL at 19:53

## 2021-08-17 RX ADMIN — Medication 1 MG: at 23:39

## 2021-08-17 RX ADMIN — POTASSIUM CHLORIDE, DEXTROSE MONOHYDRATE AND SODIUM CHLORIDE: 150; 5; 450 INJECTION, SOLUTION INTRAVENOUS at 00:49

## 2021-08-17 RX ADMIN — SODIUM CHLORIDE: 9 INJECTION, SOLUTION INTRAVENOUS at 18:07

## 2021-08-17 RX ADMIN — POTASSIUM CHLORIDE 40 MEQ: 1500 TABLET, EXTENDED RELEASE ORAL at 12:33

## 2021-08-17 RX ADMIN — POTASSIUM CHLORIDE, DEXTROSE MONOHYDRATE AND SODIUM CHLORIDE: 150; 5; 450 INJECTION, SOLUTION INTRAVENOUS at 08:34

## 2021-08-17 RX ADMIN — POTASSIUM CHLORIDE 40 MEQ: 1500 TABLET, EXTENDED RELEASE ORAL at 06:42

## 2021-08-17 RX ADMIN — ENOXAPARIN SODIUM 50 MG: 60 INJECTION SUBCUTANEOUS at 20:15

## 2021-08-17 RX ADMIN — DEXAMETHASONE 6 MG: 2 TABLET ORAL at 12:33

## 2021-08-17 RX ADMIN — SODIUM CHLORIDE 4 UNITS/HR: 9 INJECTION, SOLUTION INTRAVENOUS at 07:50

## 2021-08-17 ASSESSMENT — ACTIVITIES OF DAILY LIVING (ADL)
ADLS_ACUITY_SCORE: 12

## 2021-08-17 NOTE — PROGRESS NOTES
Formerly [facility]    Medicine Progress Note - Hospitalist Service       Date of Admission:  8/14/2021    Assessment & Plan           Patient is a 60-year-old gentleman with past medical history of uncontrolled noninsulin-dependent type 2 diabetes, reflux, hyperlipidemia who presented with a 1 week history of shortness of breath, nausea, vomiting was found to have COVID-19 pneumonia in addition to diabetic ketoacidosis and is currently admitted in the ICU for DKA management.  He has had improvement of his DKA labs but with ongoing mildly compensated acidosis with minimal oral intake and ongoing issues with nausea.  He continues to need insulin infusion until he is able to tolerate clears adequately.  His respiratory symptoms have remained overall stable with no oxygen supplementation needs      8/17 :    Sob some better and some cough   On 2 liters of oxygen, will wean off  Continue on augmentin, dexamethasone and remdesivir  Nausea better, will advance diet  Will give lantus insulin, discontinue insulin infusion and start SSi  DKA has resolved    Possible discharge in 1-2 days to home if off oxygen      A/p :        Principal Problem:    DKA (diabetic ketoacidoses) (H)    Assessment: Thought likely secondary to COVID-19 infection causing nausea/vomiting and minimal oral intake in the context of uncontrolled underlying diabetes.  Resolving with standard management    Plan: Continue with DKA protocol with ongoing insulin drip and dextrose containing IV fluids at this time until VBG fully normalizes and patient is taking clears adequately.    Active Problems:    Pneumonia due to 2019 novel coronavirus; COVID-19 virus infection    Assessment: Positive Covid test at time of admission with pneumonia noted on CT scan classic for Covid etiology    Plan: Patient started on remdesivir, continued dexamethasone, Lovenox for DVT prophylaxis.  Will monitor patient's respiratory status closely as well as  daily Covid labs.  Patient is high risk for respiratory worsening secondary to IV fluids needed for DKA as above and nursing staff to inform if O2 supplementation needs to be placed      Respiratory distress    Assessment: Thought multifactorial with patient having significant improvement in his work of breathing and degree of tachypnea as DKA improves but continues to have some tachypnea present, especially with activity which is likely secondary to his COVID-19 pneumonia     Plan: proceed with treatment of both of these conditions as above      Nausea and vomiting    Assessment: May be second to COVID-19 infection versus DKA or combination of both.  Continues to be present and patient is not tolerating more than ice chips at this time    Plan: Continue with as needed antiemetics, nursing staff will provide clear liquids as tolerated by the patient      Hypokalemia    Assessment: Not visibly present at time of admission but likely was artificially elevated secondary to DKA.  Patient has been started on potassium supplementation in addition to maintenance potassium in his IV fluids    Plan: Continue to supplement as appropriate      Dehydration    Assessment: Present at time of admission likely secondary to DKA and minimal oral intake in the week leading up to this hospitalization.  Now resolved following fluid resuscitation    Plan: Continue with current IV fluids for ongoing DKA management and monitor for ongoing resolution      Troponin level elevated    Assessment: Troponin mildly elevated at 0.087 at time of admission and has increased up to 0.179 this morning but EKG is normal and patient is having no chest pain or other symptoms concerning for cardiac etiology.  May be demand ischemia from DKA versus Covid infection    Plan: We will recheck troponin again tomorrow and if it continues to elevate despite DKA improvement could consider echocardiogram to further evaluate cardiac status.      Acute otitis media of  left ear with perforated tympanic membrane    Assessment: Patient began having purulent pus draining from his left ear on 8/15 with history of significant ear pressure and discomfort in the days leading up to this with resolution of the pressure and discomfort once perforation occurred.  Patient has been started on Augmentin    Plan: Continue Augmentin for total 10-day completion course      SIRS (systemic inflammatory response syndrome) vs sepsis    Assessment: Secondary to DKA versus Covid infection, symptoms are improving    Plan: Continue with treatment of both conditions as outlined above      Type 2 diabetes mellitus with diabetic neuropathy (H)    Assessment: Hemoglobin A1c is 12.8, indicating overall poor control.  Currently on insulin drip    Plan: We will continue with insulin drip per DKA protocol.  If patient is starting to take in some clear liquids, will cover with 1 unit per 15 g of carb coverage and watch blood sugars closely.      Hyperlipidemia LDL goal <100    Assessment: LDL checked during this hospital stay and is 98, patient is not on any statin therapy    Plan: Routine outpatient follow-up     Diet: Full Liquid Diet    DVT Prophylaxis: Enoxaparin (Lovenox) SQ  Garcia Catheter: Not present  Central Lines: None  Code Status: Full Code      Disposition Plan   Expected discharge: 3-5 days recommended to prior living arrangement once SIRS/Sepsis treated and patient tolerating adequate oral intake, blood sugars well controlled, COVID symptoms not worsening.     The patient's care was discussed with the Bedside Nurse, Patient and Patient's Family.    Aristides Mark MD  Hospitalist Service  [facility]  Securely message with the Vocera Web Console (learn more here)  Text page via [employer] Paging/Directory      Clinically Significant Risk Factors Present on Admission             ______________________________________________________________________      Data reviewed today: I  reviewed all medications, new labs and imaging results over the last 24 hours.    Physical Exam   Vital Signs: Temp: 98.9  F (37.2  C) Temp src: Oral BP: (!) 161/91 Pulse: 79   Resp: 27 SpO2: 93 % O2 Device: Nasal cannula Oxygen Delivery: 2 LPM  Weight: 230 lbs 6.4 oz  Constitutional: awake, alert, cooperative, appears acutely ill but slightly improved from yesterday, and appears stated age  Respiratory: No increased work of breathing at rest but patient does develop mild tachypnea even with shifting positions in bed to allow for better exam.  Ongoing diminished breath sounds diffusely without crackles or wheezes  Cardiovascular: Regular rate and rhythm without murmur   GI: bowel sounds present, abdomen soft and nontender  Skin: no redness, warmth, or swelling and no rashes  Musculoskeletal: no lower extremity pitting edema present  Neurologic: Awake, alert, oriented to name, place and situaiton     Data   Recent Labs   Lab 08/17/21  1049 08/17/21  1045 08/17/21  0956 08/17/21  0844 08/17/21  0531 08/16/21  2154 08/16/21  1506 08/16/21  1427 08/16/21  0612 08/16/21  0533 08/15/21  1028 08/15/21  0824 08/15/21  0749 08/15/21  0618 08/14/21  1822 08/14/21  1706   WBC  --   --   --   --  6.4  --   --   --   --  7.7  --   --   --  6.6  --  6.8   HGB  --   --   --   --  14.0  --   --   --   --  14.3  --   --   --  15.2  --  15.9   MCV  --   --   --   --  80  --   --   --   --  81  --   --   --  86  --  84   PLT  --   --   --   --  199  --   --   --   --  185  --   --   --  182  --  178   INR  --   --   --   --  1.11  --   --   --   --  1.06  --  1.16*  --   --   --   --    NA  --   --   --   --  140 141  --  139   < > 137  --   --    < > 135  --  133   POTASSIUM 3.2*  --   --   --  3.3* 3.7   < > 3.3*   < > 2.9*  --   --    < > 4.0  --  3.9   CHLORIDE  --   --   --   --  111* 113*  --  111*   < > 112*  --   --    < > 109  --  104   CO2  --   --   --   --  23 20  --  21   < > 17*  --   --    < > 7*  --  9*   BUN  --   --    --   --  7 8  --  9   < > 11  --   --    < > 21  --  22   CR  --   --   --   --  0.54* 0.55*  --  0.60*   < > 0.56*  --   --    < > 0.74  --  0.74   ANIONGAP  --   --   --   --  6 8  --  7   < > 8  --   --    < > 19*  --  20*   CESAR  --   --   --   --  7.8* 8.0*  --  7.9*   < > 7.8*  --   --    < > 8.5  --  9.2   GLC  --  181* 196* 168* 148* 160*  --  158*   < > 180*   < >  --    < > 317*   < > 351*   ALBUMIN  --   --   --   --   --   --   --   --   --   --   --   --   --   --   --  3.2*   PROTTOTAL  --   --   --   --   --   --   --   --   --   --   --   --   --   --   --  7.9   BILITOTAL  --   --   --   --   --   --   --   --   --   --   --   --   --   --   --  0.5   ALKPHOS  --   --   --   --   --   --   --   --   --   --   --   --   --   --   --  41   ALT  --   --   --   --   --   --   --   --   --   --   --   --   --   --   --  22   AST  --   --   --   --   --   --   --   --   --   --   --   --   --   --   --  14   TROPONIN  --   --   --   --  0.076*  --   --   --   --  0.179*  --   --   --  0.114*   < > 0.087*    < > = values in this interval not displayed.

## 2021-08-17 NOTE — PLAN OF CARE
Problem: Adult Inpatient Plan of Care  Goal: Plan of Care Review  Outcome: Improving  Goal: Absence of Hospital-Acquired Illness or Injury  Outcome: Improving  Intervention: Identify and Manage Fall Risk  Recent Flowsheet Documentation  Taken 8/17/2021 0000 by Simone Richard RN  Safety Promotion/Fall Prevention:   activity supervised   fall prevention program maintained   increased rounding and observation   nonskid shoes/slippers when out of bed   room near nurse's station   room organization consistent   safety round/check completed   supervised activity  Taken 8/16/2021 2000 by Simone Richard RN  Safety Promotion/Fall Prevention:   activity supervised   fall prevention program maintained   increased rounding and observation   nonskid shoes/slippers when out of bed   room near nurse's station   room organization consistent   safety round/check completed   supervised activity  Intervention: Prevent Skin Injury  Recent Flowsheet Documentation  Taken 8/17/2021 0000 by Simone Richard RN  Body Position: position changed independently  Taken 8/16/2021 2000 by Simone Richard RN  Body Position: position changed independently  Intervention: Prevent and Manage VTE (Venous Thromboembolism) Risk  Recent Flowsheet Documentation  Taken 8/17/2021 0000 by Simone Richard RN  VTE Prevention/Management: anticoagulant therapy maintained  Taken 8/16/2021 2000 by Simone Richard RN  VTE Prevention/Management: anticoagulant therapy maintained  Intervention: Prevent Infection  Recent Flowsheet Documentation  Taken 8/17/2021 0000 by Simone Richard RN  Infection Prevention:   rest/sleep promoted   single patient room provided   hand hygiene promoted  Taken 8/16/2021 2000 by Simone Richard RN  Infection Prevention:   rest/sleep promoted   single patient room provided   hand hygiene promoted  Goal: Optimal Comfort and Wellbeing  Outcome: Improving  Goal: Readiness for Transition of Care  Outcome: Improving     Problem: Nausea and  Vomiting  Goal: Fluid and Electrolyte Balance  Outcome: Improving     Problem: Diabetes Comorbidity  Goal: Blood Glucose Level Within Targeted Range  Outcome: Improving     Problem: Diabetic Ketoacidosis  Goal: Fluid and Electrolyte Balance with Absence of Ketosis  Outcome: Improving       Insulin drip continues, currently on algorithm 4 at 4 units an hr, blood glucose levels have been between 139-167 this shift, Lung sounds are clear and diminished in the base, Pt denies any shortness of breath, Pt did desat to 87 when last up to use the urinal, did not recover from desat and oxygen was placed via nasal canula at that time, oxygen is now at 2.5 LPM and Pt maintaining well at 96 percent.   No complaints of pain, Pt voiding well, Pt is hypertensive this shift, and afebrile, telemetry show Sinus rhythm.   Will continue to monitor respiratory status, titrate insulin as needed, and follow plan of care.

## 2021-08-17 NOTE — PROGRESS NOTES
Patient off of insulin drip, diet changed to moderate CHO for supper now that Lantus is on board. Tele monitoring stopped. Will move to Med/Surg this afternoon.

## 2021-08-17 NOTE — PLAN OF CARE
Problem: Adult Inpatient Plan of Care  Goal: Patient-Specific Goal (Individualized)  Description: Patient will tolerate increase in diet with minimal nausea today  Outcome: Improving     Problem: Adult Inpatient Plan of Care  Goal: Readiness for Transition of Care  Outcome: Improving     Problem: Nausea and Vomiting  Goal: Fluid and Electrolyte Balance  Outcome: Improving  Intervention: Prevent and Manage Nausea and Vomiting  Recent Flowsheet Documentation  Taken 8/17/2021 0800 by Albina Shell RN  Oral Care: teeth brushed     Problem: Diabetes Comorbidity  Goal: Blood Glucose Level Within Targeted Range  Outcome: Improving   Patient is tolerating insulin drip. Transitioning to full liquid diet for lunch and CHO diet for supper as long as he continues to tolerate it ok. Will give Lantus insulin this afternoon and stop insulin drip 2 hours after Lantus is given at the direction of Dr. Mark.  Once all of that is complete, then may transfer out of ICU to the Medical/Surgical floor.  Patient is utilizing incentive spirometer today 1605-1215 ml, 10 x per hour which helps him produce a productive whitish phlegm. Lung sounds have good movement. Weaning oxygen as able.

## 2021-08-18 LAB
ANION GAP SERPL CALCULATED.3IONS-SCNC: 6 MMOL/L (ref 3–14)
BUN SERPL-MCNC: 12 MG/DL (ref 7–30)
CALCIUM SERPL-MCNC: 8.1 MG/DL (ref 8.5–10.1)
CHLORIDE BLD-SCNC: 111 MMOL/L (ref 94–109)
CO2 SERPL-SCNC: 24 MMOL/L (ref 20–32)
CREAT SERPL-MCNC: 0.59 MG/DL (ref 0.66–1.25)
D DIMER PPP FEU-MCNC: 1.04 UG/ML FEU (ref 0–0.5)
ERYTHROCYTE [DISTWIDTH] IN BLOOD BY AUTOMATED COUNT: 13.6 % (ref 10–15)
FIBRINOGEN PPP-MCNC: 581 MG/DL (ref 170–490)
GFR SERPL CREATININE-BSD FRML MDRD: >90 ML/MIN/1.73M2
GLUCOSE BLD-MCNC: 222 MG/DL (ref 70–99)
GLUCOSE BLDC GLUCOMTR-MCNC: 195 MG/DL (ref 70–99)
GLUCOSE BLDC GLUCOMTR-MCNC: 230 MG/DL (ref 70–99)
GLUCOSE BLDC GLUCOMTR-MCNC: 234 MG/DL (ref 70–99)
GLUCOSE BLDC GLUCOMTR-MCNC: 288 MG/DL (ref 70–99)
GLUCOSE BLDC GLUCOMTR-MCNC: 291 MG/DL (ref 70–99)
HCT VFR BLD AUTO: 39.9 % (ref 40–53)
HGB BLD-MCNC: 14.2 G/DL (ref 13.3–17.7)
INR PPP: 1.07 (ref 0.85–1.15)
LDH SERPL L TO P-CCNC: 305 U/L (ref 85–227)
MAGNESIUM SERPL-MCNC: 2.1 MG/DL (ref 1.6–2.3)
MCH RBC QN AUTO: 28.5 PG (ref 26.5–33)
MCHC RBC AUTO-ENTMCNC: 35.6 G/DL (ref 31.5–36.5)
MCV RBC AUTO: 80 FL (ref 78–100)
PHOSPHATE SERPL-MCNC: 1.5 MG/DL (ref 2.5–4.5)
PLATELET # BLD AUTO: 214 10E3/UL (ref 150–450)
POTASSIUM BLD-SCNC: 3.5 MMOL/L (ref 3.4–5.3)
RBC # BLD AUTO: 4.98 10E6/UL (ref 4.4–5.9)
SODIUM SERPL-SCNC: 141 MMOL/L (ref 133–144)
TROPONIN I SERPL-MCNC: 0.06 UG/L (ref 0–0.04)
WBC # BLD AUTO: 6.7 10E3/UL (ref 4–11)

## 2021-08-18 PROCEDURE — 250N000011 HC RX IP 250 OP 636: Performed by: INTERNAL MEDICINE

## 2021-08-18 PROCEDURE — 250N000013 HC RX MED GY IP 250 OP 250 PS 637: Performed by: INTERNAL MEDICINE

## 2021-08-18 PROCEDURE — 120N000001 HC R&B MED SURG/OB

## 2021-08-18 PROCEDURE — 85379 FIBRIN DEGRADATION QUANT: CPT | Performed by: INTERNAL MEDICINE

## 2021-08-18 PROCEDURE — 83735 ASSAY OF MAGNESIUM: CPT | Performed by: INTERNAL MEDICINE

## 2021-08-18 PROCEDURE — 85384 FIBRINOGEN ACTIVITY: CPT | Performed by: INTERNAL MEDICINE

## 2021-08-18 PROCEDURE — 36415 COLL VENOUS BLD VENIPUNCTURE: CPT | Performed by: INTERNAL MEDICINE

## 2021-08-18 PROCEDURE — 250N000012 HC RX MED GY IP 250 OP 636 PS 637: Performed by: INTERNAL MEDICINE

## 2021-08-18 PROCEDURE — 83615 LACTATE (LD) (LDH) ENZYME: CPT | Performed by: INTERNAL MEDICINE

## 2021-08-18 PROCEDURE — 85027 COMPLETE CBC AUTOMATED: CPT | Performed by: INTERNAL MEDICINE

## 2021-08-18 PROCEDURE — 99233 SBSQ HOSP IP/OBS HIGH 50: CPT | Performed by: INTERNAL MEDICINE

## 2021-08-18 PROCEDURE — 80048 BASIC METABOLIC PNL TOTAL CA: CPT | Performed by: INTERNAL MEDICINE

## 2021-08-18 PROCEDURE — 250N000009 HC RX 250: Performed by: INTERNAL MEDICINE

## 2021-08-18 PROCEDURE — 84484 ASSAY OF TROPONIN QUANT: CPT | Performed by: INTERNAL MEDICINE

## 2021-08-18 PROCEDURE — 84100 ASSAY OF PHOSPHORUS: CPT | Performed by: INTERNAL MEDICINE

## 2021-08-18 PROCEDURE — 258N000003 HC RX IP 258 OP 636: Performed by: INTERNAL MEDICINE

## 2021-08-18 PROCEDURE — 85610 PROTHROMBIN TIME: CPT | Performed by: INTERNAL MEDICINE

## 2021-08-18 RX ORDER — CALCIUM CARBONATE 500 MG/1
500 TABLET, CHEWABLE ORAL 3 TIMES DAILY PRN
Status: DISCONTINUED | OUTPATIENT
Start: 2021-08-18 | End: 2021-08-21 | Stop reason: HOSPADM

## 2021-08-18 RX ORDER — PANTOPRAZOLE SODIUM 40 MG/1
40 TABLET, DELAYED RELEASE ORAL
Status: DISCONTINUED | OUTPATIENT
Start: 2021-08-18 | End: 2021-08-21 | Stop reason: HOSPADM

## 2021-08-18 RX ADMIN — SODIUM CHLORIDE: 9 INJECTION, SOLUTION INTRAVENOUS at 16:19

## 2021-08-18 RX ADMIN — POTASSIUM & SODIUM PHOSPHATES POWDER PACK 280-160-250 MG 2 PACKET: 280-160-250 PACK at 19:40

## 2021-08-18 RX ADMIN — ASPIRIN 81 MG: 81 TABLET, DELAYED RELEASE ORAL at 08:27

## 2021-08-18 RX ADMIN — SODIUM CHLORIDE: 9 INJECTION, SOLUTION INTRAVENOUS at 04:53

## 2021-08-18 RX ADMIN — AMOXICILLIN AND CLAVULANATE POTASSIUM 1 TABLET: 875; 125 TABLET, FILM COATED ORAL at 19:40

## 2021-08-18 RX ADMIN — REMDESIVIR 100 MG: 100 INJECTION, POWDER, LYOPHILIZED, FOR SOLUTION INTRAVENOUS at 16:19

## 2021-08-18 RX ADMIN — DEXAMETHASONE 6 MG: 2 TABLET ORAL at 12:26

## 2021-08-18 RX ADMIN — ENOXAPARIN SODIUM 50 MG: 60 INJECTION SUBCUTANEOUS at 20:58

## 2021-08-18 RX ADMIN — AMOXICILLIN AND CLAVULANATE POTASSIUM 1 TABLET: 875; 125 TABLET, FILM COATED ORAL at 08:27

## 2021-08-18 RX ADMIN — ENOXAPARIN SODIUM 50 MG: 60 INJECTION SUBCUTANEOUS at 10:03

## 2021-08-18 RX ADMIN — SODIUM CHLORIDE 50 ML: 9 INJECTION, SOLUTION INTRAVENOUS at 16:37

## 2021-08-18 RX ADMIN — INSULIN GLARGINE 17 UNITS: 100 INJECTION, SOLUTION SUBCUTANEOUS at 08:28

## 2021-08-18 RX ADMIN — PANTOPRAZOLE SODIUM 40 MG: 40 TABLET, DELAYED RELEASE ORAL at 05:14

## 2021-08-18 ASSESSMENT — ACTIVITIES OF DAILY LIVING (ADL)
ADLS_ACUITY_SCORE: 12
ADLS_ACUITY_SCORE: 12
ADLS_ACUITY_SCORE: 14
ADLS_ACUITY_SCORE: 12

## 2021-08-18 ASSESSMENT — MIFFLIN-ST. JEOR: SCORE: 1865.9

## 2021-08-18 NOTE — PROGRESS NOTES
Formerly [facility]    Medicine Progress Note - Hospitalist Service       Date of Admission:  8/14/2021    Assessment & Plan           Patient is a 60-year-old gentleman with past medical history of uncontrolled noninsulin-dependent type 2 diabetes, reflux, hyperlipidemia who presented with a 1 week history of shortness of breath, nausea, vomiting was found to have COVID-19 pneumonia in addition to diabetic ketoacidosis and is currently admitted in the ICU for DKA management.  He has had improvement of his DKA labs but with ongoing mildly compensated acidosis with minimal oral intake and ongoing issues with nausea.  He continues to need insulin infusion until he is able to tolerate clears adequately.  His respiratory symptoms have remained overall stable with no oxygen supplementation needs      8/18 :    Sob some better and some cough   Still on 2 liters of oxygen, will wean off  Continue on augmentin, dexamethasone and remdesivir  Nausea improved, tolerating diet  Blood sugars high, increased lantus, on SSI  DKA has resolved    Possible discharge in 1-2 days to home if off oxygen      A/p :        Principal Problem:    DKA (diabetic ketoacidoses) (H)    Assessment: Thought likely secondary to COVID-19 infection causing nausea/vomiting and minimal oral intake in the context of uncontrolled underlying diabetes.  Resolving with standard management    Plan: Continue with DKA protocol with ongoing insulin drip and dextrose containing IV fluids at this time until VBG fully normalizes and patient is taking clears adequately.    Active Problems:    Pneumonia due to 2019 novel coronavirus; COVID-19 virus infection    Assessment: Positive Covid test at time of admission with pneumonia noted on CT scan classic for Covid etiology    Plan: Patient started on remdesivir, continued dexamethasone, Lovenox for DVT prophylaxis.  Will monitor patient's respiratory status closely as well as daily Covid labs.   Patient is high risk for respiratory worsening secondary to IV fluids needed for DKA as above and nursing staff to inform if O2 supplementation needs to be placed      Respiratory distress    Assessment: Thought multifactorial with patient having significant improvement in his work of breathing and degree of tachypnea as DKA improves but continues to have some tachypnea present, especially with activity which is likely secondary to his COVID-19 pneumonia     Plan: proceed with treatment of both of these conditions as above      Nausea and vomiting    Assessment: May be second to COVID-19 infection versus DKA or combination of both.  Continues to be present and patient is not tolerating more than ice chips at this time    Plan: Continue with as needed antiemetics, nursing staff will provide clear liquids as tolerated by the patient      Hypokalemia    Assessment: Not visibly present at time of admission but likely was artificially elevated secondary to DKA.  Patient has been started on potassium supplementation in addition to maintenance potassium in his IV fluids    Plan: Continue to supplement as appropriate      Dehydration    Assessment: Present at time of admission likely secondary to DKA and minimal oral intake in the week leading up to this hospitalization.  Now resolved following fluid resuscitation    Plan: Continue with current IV fluids for ongoing DKA management and monitor for ongoing resolution      Troponin level elevated    Assessment: Troponin mildly elevated at 0.087 at time of admission and has increased up to 0.179 this morning but EKG is normal and patient is having no chest pain or other symptoms concerning for cardiac etiology.  May be demand ischemia from DKA versus Covid infection    Plan: We will recheck troponin again tomorrow and if it continues to elevate despite DKA improvement could consider echocardiogram to further evaluate cardiac status.      Acute otitis media of left ear with  perforated tympanic membrane    Assessment: Patient began having purulent pus draining from his left ear on 8/15 with history of significant ear pressure and discomfort in the days leading up to this with resolution of the pressure and discomfort once perforation occurred.  Patient has been started on Augmentin    Plan: Continue Augmentin for total 10-day completion course      SIRS (systemic inflammatory response syndrome) vs sepsis    Assessment: Secondary to DKA versus Covid infection, symptoms are improving    Plan: Continue with treatment of both conditions as outlined above      Type 2 diabetes mellitus with diabetic neuropathy (H)    Assessment: Hemoglobin A1c is 12.8, indicating overall poor control.  Currently on insulin drip    Plan: We will continue with insulin drip per DKA protocol.  If patient is starting to take in some clear liquids, will cover with 1 unit per 15 g of carb coverage and watch blood sugars closely.      Hyperlipidemia LDL goal <100    Assessment: LDL checked during this hospital stay and is 98, patient is not on any statin therapy    Plan: Routine outpatient follow-up     Diet: Consistent Carb 60 grams CHO per Meal Diet    DVT Prophylaxis: Enoxaparin (Lovenox) SQ  Garcia Catheter: Not present  Central Lines: None  Code Status: Full Code      Disposition Plan   Expected discharge: 3-5 days recommended to prior living arrangement once SIRS/Sepsis treated and patient tolerating adequate oral intake, blood sugars well controlled, COVID symptoms not worsening.     The patient's care was discussed with the Bedside Nurse, Patient and Patient's Family.    Aristides Mark MD  Hospitalist Service  [facility]  Securely message with the Vocera Web Console (learn more here)  Text page via [employer] Paging/Directory      Clinically Significant Risk Factors Present on Admission             ______________________________________________________________________      Data  reviewed today: I reviewed all medications, new labs and imaging results over the last 24 hours.    Physical Exam   Vital Signs: Temp: 99.2  F (37.3  C) Temp src: Oral BP: 139/73 Pulse: 81   Resp: 18 SpO2: 92 % O2 Device: Nasal cannula Oxygen Delivery: 2 LPM  Weight: 232 lbs 11.2 oz  Constitutional: awake, alert, cooperative, appears acutely ill but slightly improved from yesterday, and appears stated age  Respiratory: No increased work of breathing at rest but patient does develop mild tachypnea even with shifting positions in bed to allow for better exam.  Ongoing diminished breath sounds diffusely without crackles or wheezes  Cardiovascular: Regular rate and rhythm without murmur   GI: bowel sounds present, abdomen soft and nontender  Skin: no redness, warmth, or swelling and no rashes  Musculoskeletal: no lower extremity pitting edema present  Neurologic: Awake, alert, oriented to name, place and situaiton     Data   Recent Labs   Lab 08/18/21  1633 08/18/21  1208 08/18/21  0807 08/18/21  0559 08/17/21  1641 08/17/21  1049 08/17/21  0531 08/16/21  2154 08/16/21  0612 08/16/21  0533 08/14/21  1822 08/14/21  1706   WBC  --   --   --  6.7  --   --  6.4  --   --  7.7   < > 6.8   HGB  --   --   --  14.2  --   --  14.0  --   --  14.3   < > 15.9   MCV  --   --   --  80  --   --  80  --   --  81   < > 84   PLT  --   --   --  214  --   --  199  --   --  185   < > 178   INR  --   --   --  1.07  --   --  1.11  --   --  1.06   < >  --    NA  --   --   --  141  --   --  140 141   < > 137   < > 133   POTASSIUM  --   --   --  3.5 4.0 3.2* 3.3* 3.7   < > 2.9*   < > 3.9   CHLORIDE  --   --   --  111*  --   --  111* 113*   < > 112*   < > 104   CO2  --   --   --  24  --   --  23 20   < > 17*   < > 9*   BUN  --   --   --  12  --   --  7 8   < > 11   < > 22   CR  --   --   --  0.59*  --   --  0.54* 0.55*   < > 0.56*   < > 0.74   ANIONGAP  --   --   --  6  --   --  6 8   < > 8   < > 20*   CESAR  --   --   --  8.1*  --   --  7.8* 8.0*    < > 7.8*   < > 9.2   * 234* 195* 222*  --   --  148* 160*   < > 180*   < > 351*   ALBUMIN  --   --   --   --   --   --   --   --   --   --   --  3.2*   PROTTOTAL  --   --   --   --   --   --   --   --   --   --   --  7.9   BILITOTAL  --   --   --   --   --   --   --   --   --   --   --  0.5   ALKPHOS  --   --   --   --   --   --   --   --   --   --   --  41   ALT  --   --   --   --   --   --   --   --   --   --   --  22   AST  --   --   --   --   --   --   --   --   --   --   --  14   TROPONIN  --   --   --  0.057*  --   --  0.076*  --   --  0.179*   < > 0.087*    < > = values in this interval not displayed.

## 2021-08-18 NOTE — PROGRESS NOTES
S-(situation): Transfer to med/surg at about 1700    B-(background): Covid+, DKA    A-(assessment): Patient walked to his new room escorted by 2 staff who were helping to move his equipment and belongings. Patient steady on his feet.  IV Fluids resumed, Remdesivir given as ordered. Off insulin drip around 1430, supper blood sugar was 313. Lantus insulin given today. Patient stated he has not had a BM in about 5 days, states prune juice usually works well for him, but declined any tonight as he thought he would soil the bed. Knows he should ask for it in the morning. Appetite good. Weaned down to 1 LPM NC O2 today.     R-(recommendations): Monitor need for O2, wean as able. Monitor blood sugars.

## 2021-08-18 NOTE — PLAN OF CARE
"/73 (BP Location: Left arm)   Pulse 81   Temp 99.2  F (37.3  C) (Oral)   Resp 18   Ht 1.753 m (5' 9\")   Wt 105.6 kg (232 lb 11.2 oz)   SpO2 92%   BMI 34.36 kg/m     Patient is A&Ox4. Denies pain this shift. Remains on 2L O2 all shift with O2 between 89-93%. O2 occasionally dips with activity. Patient does not feel short of breath. Lungs are clear but diminished. Up independently in room. Showered today. Voiding adequately. Blood sugars of 195, 234, and 291 with coverage given. K+ check in the morning. NS running at 100ml/hr. IV remdesivir given. No complaints at this time. Will continue to monitor.   "

## 2021-08-18 NOTE — PLAN OF CARE
Problem: Adult Inpatient Plan of Care  Goal: Plan of Care Review  Outcome: Improving  Goal: Absence of Hospital-Acquired Illness or Injury  Outcome: Improving  Intervention: Identify and Manage Fall Risk  Recent Flowsheet Documentation  Taken 8/18/2021 0300 by Simone Richard RN  Safety Promotion/Fall Prevention:   activity supervised   fall prevention program maintained   lighting adjusted   nonskid shoes/slippers when out of bed   room organization consistent   safety round/check completed   supervised activity  Taken 8/17/2021 1930 by Simone Richard RN  Safety Promotion/Fall Prevention:   activity supervised   fall prevention program maintained   lighting adjusted   nonskid shoes/slippers when out of bed   room organization consistent   safety round/check completed   supervised activity  Intervention: Prevent Skin Injury  Recent Flowsheet Documentation  Taken 8/18/2021 0300 by Simone Richard RN  Body Position: position changed independently  Taken 8/17/2021 1930 by Simone Richard RN  Body Position: position changed independently  Intervention: Prevent and Manage VTE (Venous Thromboembolism) Risk  Recent Flowsheet Documentation  Taken 8/18/2021 0300 by Simone Richard RN  VTE Prevention/Management: anticoagulant therapy maintained  Taken 8/17/2021 1930 by Simone Richard RN  VTE Prevention/Management: anticoagulant therapy maintained  Intervention: Prevent Infection  Recent Flowsheet Documentation  Taken 8/18/2021 0300 by Simone Richard RN  Infection Prevention:   rest/sleep promoted   single patient room provided  Taken 8/17/2021 1930 by Simone Richard RN  Infection Prevention:   rest/sleep promoted   single patient room provided  Goal: Optimal Comfort and Wellbeing  Outcome: Improving  Goal: Readiness for Transition of Care  Outcome: Improving     Problem: Nausea and Vomiting  Goal: Fluid and Electrolyte Balance  Outcome: Improving     Problem: Diabetes Comorbidity  Goal: Blood Glucose Level Within  "Targeted Range  Outcome: Improving     Problem: Diabetic Ketoacidosis  Goal: Fluid and Electrolyte Balance with Absence of Ketosis  Outcome: Improving        Pt continues to desat with activity, oxygen delivery continues at 2LPM via nasal canula to maintain oxygen saturations of greater than 90 percent. Lung sounds are clear and diminished in the bases, intermittent harsh cough present, Pt complains of hiccups and heart burn this shift,  Blood glucose levels remain high and corrected with sliding scale.   Vitals remain stable.   /81 (BP Location: Left arm)   Pulse 76   Temp 98.5  F (36.9  C) (Oral)   Resp 18   Ht 1.753 m (5' 9\")   Wt 104.5 kg (230 lb 6.4 oz)   SpO2 91%   BMI 34.02 kg/m    Pt has been steady with stand by assist of one.   IS encouragement continues though Pt is reluctant to use due to the coughing it causes.   Will continue to monitor blood sugars, respiratory status, encourage use of IS and follow plan of care.  "

## 2021-08-19 ENCOUNTER — APPOINTMENT (OUTPATIENT)
Dept: CARDIOLOGY | Facility: CLINIC | Age: 61
DRG: 177 | End: 2021-08-19
Attending: INTERNAL MEDICINE
Payer: COMMERCIAL

## 2021-08-19 LAB
ANION GAP SERPL CALCULATED.3IONS-SCNC: 6 MMOL/L (ref 3–14)
BUN SERPL-MCNC: 11 MG/DL (ref 7–30)
CALCIUM SERPL-MCNC: 8.3 MG/DL (ref 8.5–10.1)
CHLORIDE BLD-SCNC: 107 MMOL/L (ref 94–109)
CO2 SERPL-SCNC: 27 MMOL/L (ref 20–32)
CREAT SERPL-MCNC: 0.53 MG/DL (ref 0.66–1.25)
D DIMER PPP FEU-MCNC: 0.49 UG/ML FEU (ref 0–0.5)
ERYTHROCYTE [DISTWIDTH] IN BLOOD BY AUTOMATED COUNT: 13.4 % (ref 10–15)
FASTING STATUS PATIENT QL REPORTED: ABNORMAL
FIBRINOGEN PPP-MCNC: 568 MG/DL (ref 170–490)
GFR SERPL CREATININE-BSD FRML MDRD: >90 ML/MIN/1.73M2
GLUCOSE BLD-MCNC: 204 MG/DL (ref 70–99)
GLUCOSE BLD-MCNC: 361 MG/DL (ref 70–99)
GLUCOSE BLDC GLUCOMTR-MCNC: 186 MG/DL (ref 70–99)
GLUCOSE BLDC GLUCOMTR-MCNC: 256 MG/DL (ref 70–99)
GLUCOSE BLDC GLUCOMTR-MCNC: 291 MG/DL (ref 70–99)
HCT VFR BLD AUTO: 39.7 % (ref 40–53)
HGB BLD-MCNC: 13.8 G/DL (ref 13.3–17.7)
INR PPP: 1.07 (ref 0.85–1.15)
LDH SERPL L TO P-CCNC: 319 U/L (ref 85–227)
LVEF ECHO: NORMAL
MCH RBC QN AUTO: 28.3 PG (ref 26.5–33)
MCHC RBC AUTO-ENTMCNC: 34.8 G/DL (ref 31.5–36.5)
MCV RBC AUTO: 81 FL (ref 78–100)
PHOSPHATE SERPL-MCNC: 3.9 MG/DL (ref 2.5–4.5)
PLATELET # BLD AUTO: 237 10E3/UL (ref 150–450)
POTASSIUM BLD-SCNC: 3.5 MMOL/L (ref 3.4–5.3)
RBC # BLD AUTO: 4.88 10E6/UL (ref 4.4–5.9)
SODIUM SERPL-SCNC: 140 MMOL/L (ref 133–144)
WBC # BLD AUTO: 7.9 10E3/UL (ref 4–11)

## 2021-08-19 PROCEDURE — 84100 ASSAY OF PHOSPHORUS: CPT | Performed by: INTERNAL MEDICINE

## 2021-08-19 PROCEDURE — 250N000013 HC RX MED GY IP 250 OP 250 PS 637: Performed by: INTERNAL MEDICINE

## 2021-08-19 PROCEDURE — 258N000003 HC RX IP 258 OP 636: Performed by: INTERNAL MEDICINE

## 2021-08-19 PROCEDURE — 82947 ASSAY GLUCOSE BLOOD QUANT: CPT | Performed by: INTERNAL MEDICINE

## 2021-08-19 PROCEDURE — 250N000012 HC RX MED GY IP 250 OP 636 PS 637: Performed by: INTERNAL MEDICINE

## 2021-08-19 PROCEDURE — 85610 PROTHROMBIN TIME: CPT | Performed by: INTERNAL MEDICINE

## 2021-08-19 PROCEDURE — 255N000002 HC RX 255 OP 636: Performed by: INTERNAL MEDICINE

## 2021-08-19 PROCEDURE — 93321 DOPPLER ECHO F-UP/LMTD STD: CPT | Mod: 26 | Performed by: INTERNAL MEDICINE

## 2021-08-19 PROCEDURE — 93308 TTE F-UP OR LMTD: CPT | Mod: 26 | Performed by: INTERNAL MEDICINE

## 2021-08-19 PROCEDURE — 93325 DOPPLER ECHO COLOR FLOW MAPG: CPT | Mod: 26 | Performed by: INTERNAL MEDICINE

## 2021-08-19 PROCEDURE — 85379 FIBRIN DEGRADATION QUANT: CPT | Performed by: INTERNAL MEDICINE

## 2021-08-19 PROCEDURE — 250N000009 HC RX 250: Performed by: INTERNAL MEDICINE

## 2021-08-19 PROCEDURE — C8924 2D TTE W OR W/O FOL W/CON,FU: HCPCS

## 2021-08-19 PROCEDURE — 250N000011 HC RX IP 250 OP 636: Performed by: INTERNAL MEDICINE

## 2021-08-19 PROCEDURE — 99233 SBSQ HOSP IP/OBS HIGH 50: CPT | Performed by: INTERNAL MEDICINE

## 2021-08-19 PROCEDURE — 120N000001 HC R&B MED SURG/OB

## 2021-08-19 PROCEDURE — 36415 COLL VENOUS BLD VENIPUNCTURE: CPT | Performed by: INTERNAL MEDICINE

## 2021-08-19 PROCEDURE — 85384 FIBRINOGEN ACTIVITY: CPT | Performed by: INTERNAL MEDICINE

## 2021-08-19 PROCEDURE — 83615 LACTATE (LD) (LDH) ENZYME: CPT | Performed by: INTERNAL MEDICINE

## 2021-08-19 PROCEDURE — 80048 BASIC METABOLIC PNL TOTAL CA: CPT | Performed by: INTERNAL MEDICINE

## 2021-08-19 PROCEDURE — 85027 COMPLETE CBC AUTOMATED: CPT | Performed by: INTERNAL MEDICINE

## 2021-08-19 RX ADMIN — POTASSIUM & SODIUM PHOSPHATES POWDER PACK 280-160-250 MG 2 PACKET: 280-160-250 PACK at 08:19

## 2021-08-19 RX ADMIN — ENOXAPARIN SODIUM 50 MG: 60 INJECTION SUBCUTANEOUS at 22:28

## 2021-08-19 RX ADMIN — ASPIRIN 81 MG: 81 TABLET, DELAYED RELEASE ORAL at 08:18

## 2021-08-19 RX ADMIN — AMOXICILLIN AND CLAVULANATE POTASSIUM 1 TABLET: 875; 125 TABLET, FILM COATED ORAL at 08:18

## 2021-08-19 RX ADMIN — AMOXICILLIN AND CLAVULANATE POTASSIUM 1 TABLET: 875; 125 TABLET, FILM COATED ORAL at 19:33

## 2021-08-19 RX ADMIN — ENOXAPARIN SODIUM 50 MG: 60 INJECTION SUBCUTANEOUS at 10:13

## 2021-08-19 RX ADMIN — INSULIN GLARGINE 17 UNITS: 100 INJECTION, SOLUTION SUBCUTANEOUS at 08:21

## 2021-08-19 RX ADMIN — SODIUM CHLORIDE 50 ML: 9 INJECTION, SOLUTION INTRAVENOUS at 16:56

## 2021-08-19 RX ADMIN — INSULIN GLARGINE 8 UNITS: 100 INJECTION, SOLUTION SUBCUTANEOUS at 12:11

## 2021-08-19 RX ADMIN — HUMAN ALBUMIN MICROSPHERES AND PERFLUTREN 4 ML: 10; .22 INJECTION, SOLUTION INTRAVENOUS at 12:07

## 2021-08-19 RX ADMIN — REMDESIVIR 100 MG: 100 INJECTION, POWDER, LYOPHILIZED, FOR SOLUTION INTRAVENOUS at 16:52

## 2021-08-19 RX ADMIN — PANTOPRAZOLE SODIUM 40 MG: 40 TABLET, DELAYED RELEASE ORAL at 06:47

## 2021-08-19 RX ADMIN — DEXAMETHASONE 6 MG: 2 TABLET ORAL at 15:03

## 2021-08-19 RX ADMIN — POTASSIUM & SODIUM PHOSPHATES POWDER PACK 280-160-250 MG 2 PACKET: 280-160-250 PACK at 14:57

## 2021-08-19 ASSESSMENT — ACTIVITIES OF DAILY LIVING (ADL)
ADLS_ACUITY_SCORE: 15
ADLS_ACUITY_SCORE: 15
ADLS_ACUITY_SCORE: 14
ADLS_ACUITY_SCORE: 15

## 2021-08-19 ASSESSMENT — MIFFLIN-ST. JEOR: SCORE: 1859.1

## 2021-08-19 NOTE — PROGRESS NOTES
[facility]    Medicine Progress Note - Hospitalist Service       Date of Admission:  8/14/2021    Assessment & Plan               Patient is a 60-year-old gentleman with past medical history of uncontrolled noninsulin-dependent type 2 diabetes, reflux, hyperlipidemia who presented with a 1 week history of shortness of breath, nausea, vomiting was found to have COVID-19 pneumonia in addition to diabetic ketoacidosis and initially admitted in the ICU for DKA management. He was put on insulin infusion and iv hydration following which DKA resolved and he was transferred out of ICU and switched to lantus and SSI. Patient continues to be on oxygen and is being currently managed with dexamethasone and remdesivir.    8/19 :    Sob some better and some cough which is improving  Still on 1-2 liters of oxygen, will wean off  Continue on augmentin, dexamethasone and remdesivir  Check echo as troponin was elevated    Nausea improved, tolerating diet  Blood sugars high in 200's, increased lantus, on SSI  DKA has resolved    Possible discharge in 1-2 days to home if off oxygen      A/p :        Principal Problem:    DKA (diabetic ketoacidoses) (H)    Assessment: Thought likely secondary to COVID-19 infection causing nausea/vomiting and minimal oral intake in the context of uncontrolled underlying diabetes.  Resolving with standard management    Plan: resolved, on lantus and sliding scale insulin.    Active Problems:    Pneumonia due to 2019 novel coronavirus; COVID-19 virus infection    Assessment: Positive Covid test at time of admission with pneumonia noted on CT scan classic for Covid etiology    Plan: Patient started on remdesivir, continued dexamethasone, Lovenox for DVT prophylaxis.  Will monitor patient's respiratory status closely as well as daily Covid labs.  P      Respiratory distress    Assessment: Thought multifactorial with patient having significant improvement in his work of  breathing and degree of tachypnea as DKA improves but continues to have some tachypnea present, especially with activity which is likely secondary to his COVID-19 pneumonia     Plan: proceed with treatment of both of these conditions as above      Nausea and vomiting    Assessment: May be second to COVID-19 infection versus DKA or combination of both.  Continues to be present and patient is not tolerating more than ice chips at this time    Plan: Continue with as needed antiemetics, nursing staff will provide clear liquids as tolerated by the patient      Hypokalemia    Assessment: Not visibly present at time of admission but likely was artificially elevated secondary to DKA.  Patient has been started on potassium supplementation in addition to maintenance potassium in his IV fluids    Plan: Continue to supplement as appropriate      Dehydration    Assessment: Present at time of admission likely secondary to DKA and minimal oral intake in the week leading up to this hospitalization.  Now resolved following fluid resuscitation    Plan: Continue with current IV fluids for ongoing DKA management and monitor for ongoing resolution      Troponin level elevated    Assessment: Troponin mildly elevated at 0.087 at time of admission and has increased up to 0.179 this morning but EKG is normal and patient is having no chest pain or other symptoms concerning for cardiac etiology.  May be demand ischemia from DKA versus Covid infection    Plan: no CP, no ischemic changes on EKG., Echo normal, likely type 2 MI      Acute otitis media of left ear with perforated tympanic membrane    Assessment: Patient began having purulent pus draining from his left ear on 8/15 with history of significant ear pressure and discomfort in the days leading up to this with resolution of the pressure and discomfort once perforation occurred.  Patient has been started on Augmentin    Plan: Continue Augmentin for total 10-day completion course      SIRS  (systemic inflammatory response syndrome) vs sepsis    Assessment: Secondary to DKA versus Covid infection, symptoms are improving    Plan: Continue with treatment of both conditions as outlined above      Type 2 diabetes mellitus with diabetic neuropathy (H)    Assessment: Hemoglobin A1c is 12.8, indicating overall poor control.      Plan: on Lantus + SSI      Hyperlipidemia LDL goal <100    Assessment: LDL checked during this hospital stay and is 98, patient is not on any statin therapy    Plan: Routine outpatient follow-up     Diet: Consistent Carb 60 grams CHO per Meal Diet    DVT Prophylaxis: Enoxaparin (Lovenox) SQ  Garcia Catheter: Not present  Central Lines: None  Code Status: Full Code      Disposition Plan   Expected discharge: 3-5 days recommended to prior living arrangement once SIRS/Sepsis treated and patient tolerating adequate oral intake, blood sugars well controlled, COVID symptoms not worsening.     The patient's care was discussed with the Bedside Nurse, Patient and Patient's Family.    Aristides Mark MD  Hospitalist Service  [facility]  Securely message with the Vocera Web Console (learn more here)  Text page via [employer] Paging/Directory      Clinically Significant Risk Factors Present on Admission             ______________________________________________________________________      Data reviewed today: I reviewed all medications, new labs and imaging results over the last 24 hours.    Physical Exam   Vital Signs: Temp: 98.1  F (36.7  C) Temp src: Oral BP: (!) 157/85 Pulse: 70   Resp: 20 SpO2: 91 % O2 Device: Nasal cannula Oxygen Delivery: 1 LPM  Weight: 231 lbs 3.2 oz  Constitutional: awake, alert, cooperative, appears acutely ill but slightly improved from yesterday, and appears stated age  Respiratory: No increased work of breathing at rest but patient does develop mild tachypnea even with shifting positions in bed to allow for better exam.  Ongoing diminished  breath sounds diffusely without crackles or wheezes  Cardiovascular: Regular rate and rhythm without murmur   GI: bowel sounds present, abdomen soft and nontender  Skin: no redness, warmth, or swelling and no rashes  Musculoskeletal: no lower extremity pitting edema present  Neurologic: Awake, alert, oriented to name, place and situaiton     Data   Recent Labs   Lab 08/19/21  0809 08/19/21  0702 08/18/21  2054 08/18/21  0559 08/17/21  1641 08/17/21  0638 08/17/21  0531 08/16/21  0612 08/16/21  0533 08/14/21  1822 08/14/21  1706   WBC  --  7.9  --  6.7  --   --  6.4  --  7.7   < > 6.8   HGB  --  13.8  --  14.2  --   --  14.0  --  14.3   < > 15.9   MCV  --  81  --  80  --   --  80  --  81   < > 84   PLT  --  237  --  214  --   --  199  --  185   < > 178   INR  --  1.07  --  1.07  --   --  1.11  --  1.06   < >  --    NA  --  140  --  141  --   --  140   < > 137   < > 133   POTASSIUM  --  3.5  --  3.5 4.0   < > 3.3*   < > 2.9*   < > 3.9   CHLORIDE  --  107  --  111*  --   --  111*   < > 112*   < > 104   CO2  --  27  --  24  --   --  23   < > 17*   < > 9*   BUN  --  11  --  12  --   --  7   < > 11   < > 22   CR  --  0.53*  --  0.59*  --   --  0.54*   < > 0.56*   < > 0.74   ANIONGAP  --  6  --  6  --   --  6   < > 8   < > 20*   CESAR  --  8.3*  --  8.1*  --   --  7.8*   < > 7.8*   < > 9.2   * 204* 288* 222*  --   --  148*   < > 180*   < > 351*   ALBUMIN  --   --   --   --   --   --   --   --   --   --  3.2*   PROTTOTAL  --   --   --   --   --   --   --   --   --   --  7.9   BILITOTAL  --   --   --   --   --   --   --   --   --   --  0.5   ALKPHOS  --   --   --   --   --   --   --   --   --   --  41   ALT  --   --   --   --   --   --   --   --   --   --  22   AST  --   --   --   --   --   --   --   --   --   --  14   TROPONIN  --   --   --  0.057*  --   --  0.076*  --  0.179*   < > 0.087*    < > = values in this interval not displayed.

## 2021-08-19 NOTE — PLAN OF CARE
Vss. O2 was at 1L nc with O2 sats 89-91%, then pt was up to the bathroom with O2 on and O2 sats decreased to 84%, took over 10 minutes to return to 90% even when O2 increased to 2L nc. Pt resting/sleeping between cares and O2 sats 95% on 2L nc and now O2 weaned to 1.5L nc. Lungs are clear, slight diminished. Has nonproductive cough, infrequent. Will continue with plan of care, monitor respiratory status, wean O2 as able.

## 2021-08-19 NOTE — PLAN OF CARE
Temp: 100  F (37.8  C) Temp src: Oral BP: 126/74 Pulse: 77   Resp: 22 SpO2: 91 % O2 Device: Nasal cannula Oxygen Delivery: 2 LPM  Pt denies pain.  Lung sounds diminished.  Dry, non-productive cough.  Good use of incentive spirometer.  Good appetite.  Pt tolerating activity, up ad stevenson in room.      Problem: Adult Inpatient Plan of Care  Goal: Patient-Specific Goal (Individualized)  Description: Patient will tolerate increase in diet with minimal nausea today  Outcome: No Change  Flowsheets (Taken 8/19/2021 1515)  Patient-Specific Goals (Include Timeframe): Pt will tolerate weaning O2 to RA by 8/20/21  Goal: Absence of Hospital-Acquired Illness or Injury  Intervention: Identify and Manage Fall Risk  Recent Flowsheet Documentation  Taken 8/19/2021 1000 by Brandie Reynolds RN  Safety Promotion/Fall Prevention:   assistive device/personal items within reach   clutter free environment maintained   fall prevention program maintained   nonskid shoes/slippers when out of bed   safety round/check completed   treat underlying cause   treat reversible contributory factors  Intervention: Prevent and Manage VTE (Venous Thromboembolism) Risk  Recent Flowsheet Documentation  Taken 8/19/2021 1000 by Brandie Reynolds RN  VTE Prevention/Management: anticoagulant therapy maintained     Problem: Diabetes Comorbidity  Goal: Blood Glucose Level Within Targeted Range  Intervention: Monitor and Manage Glycemia  Recent Flowsheet Documentation  Taken 8/19/2021 1000 by Brandie Reynolds RN  Glycemic Management:   blood glucose monitored   supplemental insulin given     Problem: Diabetic Ketoacidosis  Goal: Fluid and Electrolyte Balance with Absence of Ketosis  Intervention: Monitor and Manage Ketoacidosis  Recent Flowsheet Documentation  Taken 8/19/2021 1000 by Brandie Reynolds RN  Glycemic Management:   blood glucose monitored   supplemental insulin given

## 2021-08-19 NOTE — PROGRESS NOTES
"/73 (BP Location: Left arm)   Pulse 81   Temp 99.2  F (37.3  C) (Oral)   Resp 18   Ht 1.753 m (5' 9\")   Wt 105.6 kg (232 lb 11.2 oz)   SpO2 96%   BMI 34.36 kg/m  . . Phos replacement given. O2 decreased to 1 L NC. Ambulating independently with SBA. Lung sounds clear. Able to make needs known.     "

## 2021-08-19 NOTE — PROGRESS NOTES
CLINICAL NUTRITION SERVICES  Reason for Assessment:  Nutrition education regarding consistent CHO diet   Diet History:  Patient with past medical history of NIDDM, GERD and HLD.  Patient has been previously following an RD OP in early 2020 for diabetes diet education. Patient states he is aware of carbohydrate counting and has been trying to make dietary changes at home.   Nutrition Diagnosis:  Altered nutrition-related lab value related to history of T2D, poor management of blood sugars as evidence by elevated blood glucose levels (300s upon admission) and A1C of 12.8.  Interventions:  Reviewed CHO counting for meal times to better manage blood sugars.   Provided handouts Carbohydrate Counting for People with Diabetes, Using Nutrition Labels: Carbohydrates   Goals:   Patient will verbalize understanding of food sources of carbohydrates, serving sizes of carbs   Follow-up:    Patient to ask any further nutrition-related questions before discharge.  In addition, pt may request outpatient RD appointment.    Shin Beasley RDN, LD  Clinical Dietitian   Office: [phone]  Weekend Pager: [phone]

## 2021-08-20 DIAGNOSIS — E11.40 TYPE 2 DIABETES MELLITUS WITH DIABETIC NEUROPATHY, WITHOUT LONG-TERM CURRENT USE OF INSULIN (H): ICD-10-CM

## 2021-08-20 PROBLEM — J96.01 ACUTE RESPIRATORY FAILURE WITH HYPOXIA (H): Status: ACTIVE | Noted: 2021-08-15

## 2021-08-20 LAB
GLUCOSE BLDC GLUCOMTR-MCNC: 209 MG/DL (ref 70–99)
GLUCOSE BLDC GLUCOMTR-MCNC: 246 MG/DL (ref 70–99)
GLUCOSE BLDC GLUCOMTR-MCNC: 349 MG/DL (ref 70–99)
GLUCOSE BLDC GLUCOMTR-MCNC: 361 MG/DL (ref 70–99)
PHOSPHATE SERPL-MCNC: 4.2 MG/DL (ref 2.5–4.5)
POTASSIUM BLD-SCNC: 3.8 MMOL/L (ref 3.4–5.3)

## 2021-08-20 PROCEDURE — 120N000001 HC R&B MED SURG/OB

## 2021-08-20 PROCEDURE — 84100 ASSAY OF PHOSPHORUS: CPT | Performed by: INTERNAL MEDICINE

## 2021-08-20 PROCEDURE — 99232 SBSQ HOSP IP/OBS MODERATE 35: CPT | Performed by: PEDIATRICS

## 2021-08-20 PROCEDURE — 84132 ASSAY OF SERUM POTASSIUM: CPT | Performed by: INTERNAL MEDICINE

## 2021-08-20 PROCEDURE — 250N000011 HC RX IP 250 OP 636: Performed by: INTERNAL MEDICINE

## 2021-08-20 PROCEDURE — 250N000012 HC RX MED GY IP 250 OP 636 PS 637: Performed by: INTERNAL MEDICINE

## 2021-08-20 PROCEDURE — 36415 COLL VENOUS BLD VENIPUNCTURE: CPT | Performed by: INTERNAL MEDICINE

## 2021-08-20 PROCEDURE — 258N000003 HC RX IP 258 OP 636: Performed by: INTERNAL MEDICINE

## 2021-08-20 PROCEDURE — 250N000013 HC RX MED GY IP 250 OP 250 PS 637: Performed by: INTERNAL MEDICINE

## 2021-08-20 PROCEDURE — 250N000009 HC RX 250: Performed by: INTERNAL MEDICINE

## 2021-08-20 RX ADMIN — ACETAMINOPHEN 650 MG: 325 TABLET, FILM COATED ORAL at 23:42

## 2021-08-20 RX ADMIN — REMDESIVIR 100 MG: 100 INJECTION, POWDER, LYOPHILIZED, FOR SOLUTION INTRAVENOUS at 17:06

## 2021-08-20 RX ADMIN — ENOXAPARIN SODIUM 50 MG: 60 INJECTION SUBCUTANEOUS at 20:41

## 2021-08-20 RX ADMIN — AMOXICILLIN AND CLAVULANATE POTASSIUM 1 TABLET: 875; 125 TABLET, FILM COATED ORAL at 08:15

## 2021-08-20 RX ADMIN — INSULIN GLARGINE 25 UNITS: 100 INJECTION, SOLUTION SUBCUTANEOUS at 08:15

## 2021-08-20 RX ADMIN — DEXAMETHASONE 6 MG: 2 TABLET ORAL at 12:26

## 2021-08-20 RX ADMIN — PANTOPRAZOLE SODIUM 40 MG: 40 TABLET, DELAYED RELEASE ORAL at 05:58

## 2021-08-20 RX ADMIN — SODIUM CHLORIDE 50 ML: 9 INJECTION, SOLUTION INTRAVENOUS at 18:31

## 2021-08-20 RX ADMIN — ENOXAPARIN SODIUM 50 MG: 60 INJECTION SUBCUTANEOUS at 10:00

## 2021-08-20 RX ADMIN — AMOXICILLIN AND CLAVULANATE POTASSIUM 1 TABLET: 875; 125 TABLET, FILM COATED ORAL at 20:24

## 2021-08-20 RX ADMIN — ASPIRIN 81 MG: 81 TABLET, DELAYED RELEASE ORAL at 08:15

## 2021-08-20 ASSESSMENT — MIFFLIN-ST. JEOR: SCORE: 1851.84

## 2021-08-20 ASSESSMENT — ACTIVITIES OF DAILY LIVING (ADL)
ADLS_ACUITY_SCORE: 15

## 2021-08-20 NOTE — PLAN OF CARE
"S-(situation): End of shift note    B-(background): DKA, COVID    A-(assessment): Vital signs stable. BP (!) 143/80 (BP Location: Left arm)   Pulse 78   Temp 98.7  F (37.1  C) (Oral)   Resp 20   Ht 1.753 m (5' 9\")   Wt 104.9 kg (231 lb 3.2 oz)   SpO2 90%   BMI 34.14 kg/m  .  Afebrile. Lung sounds CTA .  IS in use independently.  On 2 L O2 per NC.  Frequently dry cough.  A&O X4. Ambulating independently.  Voiding adequately.   Able to make needs known and uses call light appropriately.     R-(recommendations): Continue to monitor per care plan.    "

## 2021-08-20 NOTE — PLAN OF CARE
Pt is A & O x 4, VSS, afebrile, denies pain, weaned down to 1 L via nasal cannula with 02 sats ranging from 90% to 93%. Lung sounds CTA. Dry cough. Using IS independently in room. Good appetite today, blood sugars of 209 and 246 this shift, coverage given. Bowels audible and normal active. Potassium and phosphorus to be rechecked in AM per protocol. Up independently in room. Will continue to monitor vitals, respiratory status, POC, and try to wean 02.

## 2021-08-20 NOTE — PROGRESS NOTES
[facility]    Medicine Progress Note - Hospitalist Service       Date of Admission:  8/14/2021    Assessment & Plan           60-year-old man with poorly controlled diabetes mellitus admitted due to pneumonia for COVID-19 with systemic inflammatory response syndrome and diabetic ketoacidosis.  His clinical course has been consistent with acute hypoxic respiratory failure due to COVID-19 pneumonia.  He is improving with resolution of DKA but glycemic control is not yet optimized although likely exacerbated by ongoing corticosteroid treatment.  He continues to require supplemental oxygen, but oxygen requirement is stabilizing.  Hypokalemia has resolved.    Principal Problem:    Pneumonia due to 2019 novel coronavirus  Active Problems:    Acute respiratory failure with hypoxia (H)    DKA (diabetic ketoacidoses) (H)    Type 2 diabetes mellitus with diabetic neuropathy (H)    SIRS (systemic inflammatory response syndrome)    Hypokalemia    Hyperlipidemia LDL goal <100    Nausea and vomiting    Dehydration    Troponin level elevated    Acute otitis media of left ear with perforated tympanic membrane    Continue remdesivir, today is day 5  Continue dexamethasone, today is day 7  Continue Augmentin, today is day 6, anticipate discontinuing Augmentin at discharge  Wean oxygen to keep saturations 90 to 96%, anticipate he will require supplemental oxygen at discharge  Discussed discharge on prophylactic anticoagulation for 1 month with Xarelto or Eliquis  Lantus insulin dose increased this morning from 17 units to 25 units, anticipate discharge home on Lantus insulin which will be new for him  Anticipate discharge on Metformin but at lower than previous dose because of uncomfortable GI side effects from the higher doses  Discussed discharged on NovoLog insulin but patient expressed preference to avoid NovoLog insulin at this time if possible  Recommended that the patient monitor his blood sugars  regularly after hospital discharge and he indicated that he is obtaining a new glucometer and knows how to use it         Diet: Consistent Carb 60 grams CHO per Meal Diet    DVT Prophylaxis: Enoxaparin (Lovenox) SQ  Garcia Catheter: Not present  Central Lines: None  Code Status: Full Code      Disposition Plan   Expected discharge: Possibly tomorrow recommended to prior living arrangement once Medically stable with stable oxygen requirement.     The patient's care was discussed with the Bedside Nurse, Care Coordinator/ and Patient.    Link Flores MD  Hospitalist Service  [facility]  Securely message with the Vocera Web Console (learn more here)  Text page via [facility] Paging/Directory      Clinically Significant Risk Factors Present on Admission                ______________________________________________________________________    Interval History   He feels better today.  His breathing is improving.  He is tolerating advancing activity.  He has no new complaints.  Maximum temperature in the last day was 100.  He remains hemodynamically stable.  Oxygenation has been stable on 2 L nasal cannula supplementation now for about a day.  He is tolerating good oral intake and voiding spontaneously.    Data reviewed today: I reviewed all medications, new labs and imaging results over the last 24 hours. I personally reviewed no images or EKG's today.    Physical Exam   Vital Signs: Temp: 98  F (36.7  C) Temp src: Oral BP: (!) 150/81 Pulse: 73   Resp: 24 SpO2: 93 % O2 Device: Nasal cannula Oxygen Delivery: 1 LPM  Weight: 229 lbs 9.6 oz   Vitals:    08/14/21 1623 08/16/21 0418 08/18/21 0522 08/19/21 0047   Weight: 104.3 kg (230 lb) 104.5 kg (230 lb 6.4 oz) 105.6 kg (232 lb 11.2 oz) 104.9 kg (231 lb 3.2 oz)    08/20/21 0553   Weight: 104.1 kg (229 lb 9.6 oz)     General Appearance: Appears comfortable sitting in a chair, easily speaks full sentences  Respiratory: Borderline  tachypneic, few inspiratory crackles bilaterally, no wheezing  Cardiovascular: Regular rate and rhythm, brisk capillary refill     Data   Recent Labs   Lab 08/20/21  0813 08/20/21  0550 08/19/21  2052 08/19/21  1648 08/19/21  0702 08/18/21  0559 08/17/21  0638 08/17/21  0531 08/16/21  0612 08/16/21  0533 08/14/21  1822 08/14/21  1706   WBC  --   --   --   --  7.9 6.7  --  6.4  --  7.7   < > 6.8   HGB  --   --   --   --  13.8 14.2  --  14.0  --  14.3   < > 15.9   MCV  --   --   --   --  81 80  --  80  --  81   < > 84   PLT  --   --   --   --  237 214  --  199  --  185   < > 178   INR  --   --   --   --  1.07 1.07  --  1.11  --  1.06   < >  --    NA  --   --   --   --  140 141  --  140   < > 137   < > 133   POTASSIUM  --  3.8  --   --  3.5 3.5   < > 3.3*   < > 2.9*   < > 3.9   CHLORIDE  --   --   --   --  107 111*  --  111*   < > 112*   < > 104   CO2  --   --   --   --  27 24  --  23   < > 17*   < > 9*   BUN  --   --   --   --  11 12  --  7   < > 11   < > 22   CR  --   --   --   --  0.53* 0.59*  --  0.54*   < > 0.56*   < > 0.74   ANIONGAP  --   --   --   --  6 6  --  6   < > 8   < > 20*   CESAR  --   --   --   --  8.3* 8.1*  --  7.8*   < > 7.8*   < > 9.2   *  --  361* 291* 204* 222*  --  148*   < > 180*   < > 351*   ALBUMIN  --   --   --   --   --   --   --   --   --   --   --  3.2*   PROTTOTAL  --   --   --   --   --   --   --   --   --   --   --  7.9   BILITOTAL  --   --   --   --   --   --   --   --   --   --   --  0.5   ALKPHOS  --   --   --   --   --   --   --   --   --   --   --  41   ALT  --   --   --   --   --   --   --   --   --   --   --  22   AST  --   --   --   --   --   --   --   --   --   --   --  14   TROPONIN  --   --   --   --   --  0.057*  --  0.076*  --  0.179*   < > 0.087*    < > = values in this interval not displayed.     Blood sugars ranged 186-361 over the last day    Phosphorus 4.2 today    Medications     - MEDICATION INSTRUCTIONS -       - MEDICATION INSTRUCTIONS -         remdesivir   100 mg Intravenous Q24H    And     sodium chloride 0.9%  50 mL Intravenous Q24H     amoxicillin-clavulanate  1 tablet Oral Q12H WILMA     aspirin  81 mg Oral Daily     dexamethasone  6 mg Oral Daily     enoxaparin ANTICOAGULANT  0.5 mg/kg Subcutaneous Q12H     insulin aspart  1-7 Units Subcutaneous TID AC     insulin aspart  1-5 Units Subcutaneous At Bedtime     insulin glargine  25 Units Subcutaneous QAM AC     pantoprazole  40 mg Oral QAM AC     sodium chloride (PF)  3 mL Intracatheter Q8H

## 2021-08-20 NOTE — PLAN OF CARE
Patient VSS. Up independent, dyspnea on exertion. Lungs sounds clear in all fields. Patient using incentive spirometer independently about 10 times getting up to 1500. Patient coughing up small amount of mucous.Tolerating regular carb consistent diet. Blood sugar 349- insulin given. Last dose of remdesivir given. 94% o2 sats on 1L, will continue to wean oxygen. Will continue to monitor o2 sats, lung sounds, activity tolerance, and blood sugars.

## 2021-08-20 NOTE — PROGRESS NOTES
Oxygen Documentation:   I certify that this patient, Marvin Horvath has been under my care (or a nurse practitioner or physican's assistant working with me). This is the face-to-face encounter for oxygen medical necessity.      Marvin Horvath is now in a chronic stable state and continues to require supplemental oxygen. Patient has continued oxygen desaturation due to Chronic Respiratory Failure with Hypoxia J93.11  COVID-19.    Alternative treatment(s) tried or considered and deemed clinically infective for treatment of Chronic Respiratory Failure with Hypoxia J93.11  COVID-19 include steroids and pulmonary toileting.  If portability is ordered, is the patient mobile within the home? yes    **Patients who qualify for home O2 coverage under the CMS guidelines require ABG tests or O2 sat readings obtained closest to, but no earlier than 2 days prior to the discharge, as evidence of the need for home oxygen therapy. Testing must be performed while patient is in the chronic stable state. See notes for O2 sats.**

## 2021-08-20 NOTE — PROGRESS NOTES
SPIRITUAL HEALTH SERVICES  SPIRITUAL ASSESSMENT Progress Note  [facility]    REFERRAL SOURCE: Self    I attempted to reach Marvin, by phone, as he has been hospitalized now for 5 days.  As he has COVID, Chaplains aren't permitted to visit in person.  Patient didn't answer his phone, however.  He, reportedly, has no Yarsanism preference.    PLAN: I will continue to be available to patient, by phone, as needs arise.    Chaplain Jarek Kumar, Ph.D  Office: [phone]

## 2021-08-21 VITALS
TEMPERATURE: 98.6 F | WEIGHT: 229.3 LBS | HEIGHT: 69 IN | OXYGEN SATURATION: 90 % | RESPIRATION RATE: 20 BRPM | DIASTOLIC BLOOD PRESSURE: 73 MMHG | SYSTOLIC BLOOD PRESSURE: 125 MMHG | HEART RATE: 62 BPM | BODY MASS INDEX: 33.96 KG/M2

## 2021-08-21 LAB
GLUCOSE BLDC GLUCOMTR-MCNC: 237 MG/DL (ref 70–99)
PHOSPHATE SERPL-MCNC: 3.7 MG/DL (ref 2.5–4.5)
POTASSIUM BLD-SCNC: 4.5 MMOL/L (ref 3.4–5.3)

## 2021-08-21 PROCEDURE — 250N000013 HC RX MED GY IP 250 OP 250 PS 637: Performed by: INTERNAL MEDICINE

## 2021-08-21 PROCEDURE — 36415 COLL VENOUS BLD VENIPUNCTURE: CPT | Performed by: PEDIATRICS

## 2021-08-21 PROCEDURE — 84100 ASSAY OF PHOSPHORUS: CPT | Performed by: PEDIATRICS

## 2021-08-21 PROCEDURE — 250N000011 HC RX IP 250 OP 636: Performed by: INTERNAL MEDICINE

## 2021-08-21 PROCEDURE — 99239 HOSP IP/OBS DSCHRG MGMT >30: CPT | Performed by: PEDIATRICS

## 2021-08-21 PROCEDURE — 94618 PULMONARY STRESS TESTING: CPT

## 2021-08-21 PROCEDURE — 999N000157 HC STATISTIC RCP TIME EA 10 MIN

## 2021-08-21 PROCEDURE — 84132 ASSAY OF SERUM POTASSIUM: CPT | Performed by: INTERNAL MEDICINE

## 2021-08-21 PROCEDURE — 250N000012 HC RX MED GY IP 250 OP 636 PS 637: Performed by: PEDIATRICS

## 2021-08-21 RX ADMIN — PANTOPRAZOLE SODIUM 40 MG: 40 TABLET, DELAYED RELEASE ORAL at 06:05

## 2021-08-21 RX ADMIN — INSULIN GLARGINE 30 UNITS: 100 INJECTION, SOLUTION SUBCUTANEOUS at 08:03

## 2021-08-21 RX ADMIN — ASPIRIN 81 MG: 81 TABLET, DELAYED RELEASE ORAL at 08:03

## 2021-08-21 RX ADMIN — ENOXAPARIN SODIUM 50 MG: 60 INJECTION SUBCUTANEOUS at 10:04

## 2021-08-21 RX ADMIN — AMOXICILLIN AND CLAVULANATE POTASSIUM 1 TABLET: 875; 125 TABLET, FILM COATED ORAL at 08:03

## 2021-08-21 ASSESSMENT — ACTIVITIES OF DAILY LIVING (ADL)
ADLS_ACUITY_SCORE: 15

## 2021-08-21 ASSESSMENT — MIFFLIN-ST. JEOR: SCORE: 1850.48

## 2021-08-21 NOTE — PLAN OF CARE
"S-(situation): Patient discharged to emergency room entrance via W/C with nurse    B-(background): Covid-19, DKA    A-(assessment): Pt is A & O x 4, VSS, afebrile, transitioned to room air this AM, maintaining sats around 90% - 92%. Pt does desat into the 80s with ambulation. Lung sounds CTA, dry cough remains. Bowels normal active. Good appetite. Blood sugar of 237 this AM, sliding scale coverage given. Potassium and phosphorus WNL, no replacements needed. Pt refused to wait for oxygen tank prior to discharging. States, \"I work for [employer] and have tanks at home that I had delivered last night, I will be fine until I get home\".     R-(recommendations): Discharge instructions reviewed with patient. Listed belongings gathered and returned to patient.          Discharge Nursing Criteria:     Care Plan and Patient education resolved: Yes    New Medications- pt has been educated about purpose and side effects: Yes    Vaccines  Influenza status verified at discharge:  Not Applicable    MISC  Prescriptions if needed, hard copies sent with patient  NA  Home medications returned to patient: NA  Medication Bin checked and emptied on discharge Yes  Patient reports post-discharge pain management plan is effective: Yes    TKA/CIERRA ONLY:   Discharged with WILLIE Stockings No  WILLIE stocking Education Completed No    "

## 2021-08-21 NOTE — DISCHARGE SUMMARY
[facility], located within [facility]  Hospitalist Discharge Summary      Date of Admission:  8/14/2021  Date of Discharge:  8/21/2021 10:59 AM  Discharging Provider: Link Flores MD      Discharge Diagnoses   Principal Problem:    Pneumonia due to 2019 novel coronavirus  Active Problems:    Acute respiratory failure with hypoxia (H)    DKA (diabetic ketoacidoses) (H)    Type 2 diabetes mellitus with diabetic neuropathy (H)    SIRS (systemic inflammatory response syndrome)    Hypokalemia    Hyperlipidemia LDL goal <100    Nausea and vomiting    Dehydration    Troponin level elevated    Acute otitis media of left ear with perforated tympanic membrane      Follow-ups Needed After Discharge   Follow-up Appointments     Follow-up and recommended labs and tests       Follow up with primary care provider, Zachary Andrews, within 7 days to   evaluate medication change and for hospital follow- up.             Discharge Disposition   Discharged to home  Condition at discharge: Stable      Hospital Course   60-year-old man with poorly controlled diabetes presented with almost 2-week history of body aches with subjective fever and chills, several day history of shortness of breath, and 1 day history of intractable nausea and vomiting.  Due to concerns for COVID-19 pneumonia, intractable nausea with vomiting, and diabetic ketoacidosis, he was admitted.    Problem #1 COVID-19 pneumonia with acute hypoxic respiratory failure and systemic inflammatory response syndrome.  Radiographic findings were consistent with bilateral infiltrates.  Testing for COVID-19 PCR was positive.  He was treated with dexamethasone and received 5 days of remdesivir.  He required oxygen supplementation but did not need assisted ventilation.  Signs of COVID-19 pneumonia and respiratory failure improved.  He continued to require oxygen supplementation at discharge, so new home oxygen therapy was prescribed.  He was able to tolerate advancing diet  and activity by discharge.  Clinical course was consistent with systemic inflammatory response syndrome.  Sepsis was not suspected.    Problem #2 uncontrolled type 2 diabetes with diabetic ketoacidosis.  He was hyperglycemic with metabolic acidosis and ketonemia.  Serum osmolality was normal.  He was initially treated with continuous insulin infusion and chronic Metformin was held.  As DKA resolved, he was started on subcutaneous insulin with Lantus and NovoLog during hospitalization.  He was able to tolerate advancing diet without difficulty by discharge.  After discussion of treatment options, patient expressed preference to continue Lantus treatment after hospital discharge but did not use NovoLog after discharge at least until outpatient follow-up with his PCP.  He was encouraged to monitor his blood sugars regularly and indicated that he had purchased a new glucometer for that purpose.  He expressed concerns about side effects from higher dose of Metformin during this hospitalization, so he is advised to restart Metformin at a lower dose of 500 mg twice daily to reduce risk of those side effects.  Diabetic ketoacidosis due to COVID-19 pneumonia superimposed upon underlying uncontrolled diabetes was suspected.    Problem #3 acute left otitis media with perforated tympanic membrane.  Clinical findings were consistent with left ear infection and perforated tympanic membrane.  He received a treatment course of Augmentin empirically during hospitalization.  He improved clinically, and ongoing antibiotic therapy after discharge was not recommended.  Outpatient follow-up with his PCP was recommended.    Problem #4 hypokalemia.  Hypokalemia was treated with potassium supplementation during hospitalization with resolution of hypokalemia by discharge.  Hypokalemia probably contributed to his initial symptoms of weakness.  Hypokalemia was attributed to the combination of GI losses from vomiting and inadequate oral intake  as well as electrolyte shifts associated with diabetic ketoacidosis.    Problem #5 elevated troponin.  Troponin was elevated at admission and trended downward.  He did not have angina or ischemic EKG changes.  No regional wall motion abnormalities were reported on echocardiogram.  He was not known to have underlying CAD although he does have diabetes.  After investigation, elevated troponin due to hypoxia and acute illness was suspected.  Demand ischemia, an acute coronary syndrome, or non-ST elevation MI were not suspected.  Outpatient follow-up with his PCP was recommended to consider cardiac stress testing once he recovered from this acute illness.    Consultations This Hospital Stay   None    Code Status   Full Code    Time Spent on this Encounter   I, Link Flores MD, personally saw the patient today and spent greater than 30 minutes discharging this patient.       Link Flores MD  [address] SURGICAL  [address] [place] [address]  Phone: [phone]  ______________________________________________________________________    Physical Exam   Vital Signs: Temp: 98  F (36.7  C) Temp src: Oral BP: 113/58 Pulse: 69   Resp: 18 SpO2: 93 % O2 Device: Nasal cannula Oxygen Delivery: 1 LPM  Weight: 229 lbs 4.8 oz  General Appearance: Appears comfortable while resting in bed, easily speaks full sentences  Respiratory: Normal respiratory effort, clear lung fields  Cardiovascular: Regular rate and rhythm, brisk capillary refill        Primary Care Physician   Zachary Andrews    Discharge Orders      COVID-19 GetWell Loop Referral      Reason for your hospital stay    Hospitalized due to pneumonia from COVID-19 with diabetic ketoacidosis and improved     Follow-up and recommended labs and tests     Follow up with primary care provider, Zachary Andrews, within 7 days to evaluate medication change and for hospital follow- up.     Contact provider    Contact your primary care provider  if If increased trouble breathing, new arm/leg swelling, dizziness/passing out, falls, bleeding that doesn't stop, or uncontrolled pain.     Discharge - Quarantine/Isolation Instruction    Date of symptom onset:   approximately 8/7/21  Date of first positive test:  8/14/21  Stay home and away from others (self-isolate) for at least 20 days from when your symptoms started And...   You've had no fevers and no medicine that reduces fever for 1 full day (24 hours)  And...   Your other symptoms have resolved (gotten better).     Activity    Your activity upon discharge: activity as tolerated     Monitor and record    blood glucose 4 times a day, before meals and at bedtime     Oxygen Adult/Peds    Oxygen Documentation:   I certify that this patient, Marvin Horvath has been under my care (or a nurse practitioner or physican's assistant working with me). This is the face-to-face encounter for oxygen medical necessity.      Marvin Horvath is now in a chronic stable state and continues to require supplemental oxygen. Patient has continued oxygen desaturation due to Chronic Respiratory Failure with Hypoxia J93.11  COVID-19.    Alternative treatment(s) tried or considered and deemed clinically infective for treatment of Chronic Respiratory Failure with Hypoxia J93.11  COVID-19 include steroids and pulmonary toileting.  If portability is ordered, is the patient mobile within the home? yes    **Patients who qualify for home O2 coverage under the CMS guidelines require ABG tests or O2 sat readings obtained closest to, but no earlier than 2 days prior to the discharge, as evidence of the need for home oxygen therapy. Testing must be performed while patient is in the chronic stable state. See notes for O2 sats.**     Diet    Follow this diet upon discharge: Orders Placed This Encounter      Consistent Carb 60 grams CHO per Meal Diet       Significant Results and Procedures   Most Recent 3 CBC's:Recent Labs   Lab Test 08/19/21  0702  08/18/21  0559 08/17/21  0531   WBC 7.9 6.7 6.4   HGB 13.8 14.2 14.0   MCV 81 80 80    214 199     Most Recent 3 BMP's:Recent Labs   Lab Test 08/21/21  0753 08/21/21  0543 08/20/21 2032 08/20/21  1658 08/20/21  0550 08/19/21  0809 08/19/21  0702 08/18/21  0559 08/17/21  0638 08/17/21  0531   NA  --   --   --   --   --   --  140 141  --  140   POTASSIUM  --  4.5  --   --  3.8  --  3.5 3.5   < > 3.3*   CHLORIDE  --   --   --   --   --   --  107 111*  --  111*   CO2  --   --   --   --   --   --  27 24  --  23   BUN  --   --   --   --   --   --  11 12  --  7   CR  --   --   --   --   --   --  0.53* 0.59*  --  0.54*   ANIONGAP  --   --   --   --   --   --  6 6  --  6   CESAR  --   --   --   --   --   --  8.3* 8.1*  --  7.8*   *  --  361* 349*  --    < > 204* 222*  --  148*    < > = values in this interval not displayed.     Most Recent 2 LFT's:Recent Labs   Lab Test 08/14/21  1706 10/24/18  1045   AST 14 14   ALT 22 26   ALKPHOS 41 47   BILITOTAL 0.5 0.6     Most Recent 3 INR's:Recent Labs   Lab Test 08/19/21  0702 08/18/21  0559 08/17/21  0531   INR 1.07 1.07 1.11     Most Recent 3 Troponin's:Recent Labs   Lab Test 08/18/21  0559 08/17/21  0531 08/16/21  0533   TROPONIN 0.057* 0.076* 0.179*     Most Recent 3 BNP's:No lab results found.  Most Recent D-dimer:Recent Labs   Lab Test 08/19/21  0702   DD 0.49     Most Recent Hemoglobin A1c:Recent Labs   Lab Test 08/15/21  0222   A1C 12.8*     Most Recent 6 glucoses:Recent Labs   Lab Test 08/21/21  0753 08/20/21 2032 08/20/21  1658 08/20/21  1224 08/20/21  0813 08/19/21 2052   * 361* 349* 246* 209* 361*     Most Recent ABG:Recent Labs   Lab Test 08/15/21  0901   PH 7.08*   PO2 104   PCO2 14*   HCO3 4*   NEGRO -23.4*     Most Recent ESR & CRP:Recent Labs   Lab Test 08/15/21  0824   CRP 73.7*   ,   Results for orders placed or performed during the hospital encounter of 08/14/21   XR Chest Port 1 View    Narrative    XR PORTABLE CHEST ONE VIEW   8/14/2021 5:34  PM     HISTORY: Short of breath    COMPARISON: Chest x-ray on 11/13/2019      Impression    IMPRESSION: PA and lateral views of the chest were obtained.  Cardiomediastinal silhouette is within normal limits. No suspicious  focal pulmonary opacities. No significant pleural effusion or  pneumothorax.    VIGNESH RAYMOND MD         SYSTEM ID:  [email or web address]   CT Chest Pulmonary Embolism w Contrast    Narrative    CT CHEST PULMONARY EMBOLISM WITH CONTRAST  8/14/2021 6:44 PM    HISTORY:  Tachycardia, short of breath, Covid-19 positive. Rule out  pulmonary embolism.      TECHNIQUE: Scans obtained from the apices through the diaphragm with  IV contrast. 85mL, Isovue 370 IV injected. Radiation dose for this  scan was reduced using automated exposure control, adjustment of the  mA and/or kV according to patient size, or iterative reconstruction  technique.    COMPARISON:  None available    FINDINGS:  Chest/mediastinum: No evidence of pulmonary embolism. No cardiomegaly  or significant pericardial effusion. Mild aneurysmal dilatation of the  ascending thoracic aorta measuring 4 cm in diameter. No significant  mediastinal or hilar lymphadenopathy.    Lungs and pleura: No pleural effusion or pneumothorax. Bibasilar and  subpleural predominant patchy ground glass pulmonary opacities,  worrisome for infection including atypical infection like COVID-19.    Upper abdomen: Diffuse hypoattenuation of the liver, likely due to  underlying hepatic steatosis. Moderate amount of stool in the  visualized colon.    Bones and soft tissue: Multilevel degenerative changes of the spine.  No suspicious osseous lesion.      Impression    IMPRESSION:   1. No evidence of pulmonary embolism.  2. Basilar and subpleural predominant patchy groundglass pulmonary  opacities, worrisome for infection including atypical infection like  COVID-19.  3. Significant hepatic steatosis.    VIGNESH RAYMOND MD         SYSTEM ID:  [email or web address]   Echocardiogram  Limited     Value    LVEF  60-65%    formerly [facility]    [id number]  [id number]  [id number]  [id number]^JALIL^LAURE     [facility]  Echocardiography Laboratory  [address] [place] [address]     Name: SASCHA SEN  MRN: [id number]  : 1960  Study Date: 2021 11:39 AM  Age: 60 yrs  Gender: Male  Patient Location: [facility]  Reason For Study: Chest Pain, COVID positive  History: Diabetes,Obesity  Ordering Physician: LAURE JIMENES  Referring Physician: Zachary Andrews  Performed By: Viviana Aaron     BSA: 2.2 m2  Height: 69 in  Weight: 231 lb  HR: 77  BP: 157/85 mmHg  ______________________________________________________________________________  Procedure  Limited Echo Adult. Optison (NDC #[id number]) given intravenously.  ______________________________________________________________________________  Interpretation Summary     1. The left ventricle is normal in size. There is mild concentric left  ventricular hypertrophy. Left ventricular systolic function is normal. The  visual ejection fraction is 60-65%. Diastolic Doppler findings (E/E' ratio  and/or other parameters) suggest left ventricular filling pressures are  indeterminate. No regional wall motion abnormalities noted. There is no  thrombus seen in the left ventricle.  2. The right ventricle is normal size. The right ventricular systolic function  is normal.  3. Trace mitral and tricuspid regurgitation.  4. No pericardial effusion.  5. No previous study for comparison.  ______________________________________________________________________________  Left Ventricle  The left ventricle is normal in size. There is mild concentric left  ventricular hypertrophy. Left ventricular systolic function is normal. The  visual ejection fraction is 60-65%. Diastolic Doppler findings (E/E' ratio  and/or other parameters) suggest left ventricular filling pressures are  indeterminate. No regional wall motion abnormalities noted. There is no  thrombus seen  in the left ventricle.     Right Ventricle  The right ventricle is normal size. The right ventricular systolic function is  normal.     Mitral Valve  There is trace mitral regurgitation.     Tricuspid Valve  There is trace tricuspid regurgitation. Right ventricular systolic pressure  could not be approximated due to inadequate tricuspid regurgitation.     Aortic Valve  There is mild trileaflet aortic sclerosis. No aortic regurgitation is present.  No aortic stenosis is present.     Vessels  The aortic root is normal size. The ascending aorta is Mildly dilated. The  inferior vena cava is normal.     Pericardium  There is no pericardial effusion.     Rhythm  Sinus rhythm was noted.     ______________________________________________________________________________  MMode/2D Measurements & Calculations  IVSd: 1.2 cm  LVIDd: 4.0 cm  LVIDs: 2.4 cm  LVPWd: 1.3 cm  FS: 40.0 %  LV mass(C)d: 175.8 grams  LV mass(C)dI: 80.1 grams/m2  Ao root diam: 3.6 cm  LA dimension: 4.1 cm  asc Aorta Diam: 3.9 cm  LA/Ao: 1.1     RWT: 0.65     Doppler Measurements & Calculations  MV E max ana maria: 87.4 cm/sec  MV A max ana maria: 76.3 cm/sec  MV E/A: 1.1  MV dec time: 0.26 sec  E/E' av.8  Lateral E/e': 11.2  Medial E/e': 12.4     ______________________________________________________________________________  Report approved by: Dorothy Mcgee 2021 12:33 PM               Discharge Medications   Current Discharge Medication List      START taking these medications    Details   insulin glargine (LANTUS PEN) 100 UNIT/ML pen Inject 30 Units Subcutaneous every morning (before breakfast)  Qty: 12 mL, Refills: 0    Comments: If Lantus is not covered by insurance, may substitute Basaglar at same dose and frequency.    Associated Diagnoses: Uncontrolled type 2 diabetes mellitus with hyperglycemia (H)      rivaroxaban ANTICOAGULANT (XARELTO) 10 MG TABS tablet Take 1 tablet (10 mg) by mouth daily (with dinner)  Qty: 30 tablet, Refills: 0    Associated  Diagnoses: Pneumonia due to 2019 novel coronavirus         CONTINUE these medications which have NOT CHANGED    Details   aspirin (ASA) 81 MG tablet Take 1 tablet (81 mg) by mouth daily  Qty: 90 tablet, Refills: 3      metFORMIN (GLUCOPHAGE) 1000 MG tablet Take 0.5 tablets (500 mg) by mouth 2 times daily (with meals)      blood glucose monitoring (NO BRAND SPECIFIED) test strip Use to test blood sugars 1 times daily or as directed  Qty: 100 strip, Refills: 1    Associated Diagnoses: Elevated glucose           Allergies   No Known Allergies

## 2021-08-21 NOTE — PROGRESS NOTES
Patient has been assessed for Home Oxygen needs.  Oxygen readings:   *   RA - at rest  Pulse oximetry SPO2 88 %  *   RA - during activity/with exercise SPO2 84 %  *   O2 at  1 liters/minute (at rest) ...SPO2 90%  *   O2 at  5 liters/minute (during activity/with exercise) ...SPO2 90 %      Patient works for [employer] and has requested them for his home O2 needs.

## 2021-08-21 NOTE — PLAN OF CARE
"S-(situation): End of shift note    B-(background): Covid, DKA.     A-(assessment): Vital signs stable. /58 (BP Location: Left arm)   Pulse 69   Temp 98  F (36.7  C) (Oral)   Resp 18   Ht 1.753 m (5' 9\")   Wt 104.1 kg (229 lb 9.6 oz)   SpO2 93%   BMI 33.91 kg/m     High temp of 100, tylenol given, recheck 98. Lung sounds CTA B/L. IS in use. On 1L O2 per  Nasal cannula. A&O x4. Pt denies pain. Bowel sounds normoactive. Ambulating independently. Voiding adequately. Skin is intact. IV site is clean, dry, and intact. Pt denying nausea at this time. Able to make needs known and uses call light appropriately. Blood sugars ranging 200-300s.     R-(recommendations): Continue to monitor per care plan. Anticipate discharge today with home O2.   "

## 2021-08-21 NOTE — PROGRESS NOTES
Late entry    Provided bedside RN with get well loop pt information and provided hand off to Care Coordination.    Qing Smith BSN, RN, PHN, CCM  RN Care Coordinator  Care Transitions Department  [facility] [phone]

## 2021-08-21 NOTE — PLAN OF CARE
O2 sats drop to 86% with 1L nc on when up to the bathroom. O2 sats gradually return to 90-91% after 10 minutes. Pt denies feeling that O2 sats are low during this time. Other vss. Lungs are diminished but clear. Has nonproductive cough. GM=616, 4 units novolog insulin given per sliding scale. Will continue with plan of care.

## 2021-08-22 ENCOUNTER — PATIENT OUTREACH (OUTPATIENT)
Dept: CARE COORDINATION | Facility: CLINIC | Age: 61
End: 2021-08-22

## 2021-08-22 NOTE — TELEPHONE ENCOUNTER
Care Coordination ED Discharge Follow up Note-    Patient referred for Virtual Home Monitoring Program for COVID-19. Called patient to complete assessment, verify or assist with scheduling follow up appointment with patient's PCP, offer Clinic Care Coordination services and ensure patient is engaged with the GetWell Loop. Left message for patient. RN CC to contact patient again in 1 business day.    Aliza King RN  [facility]

## 2021-08-23 NOTE — PROGRESS NOTES
Care Coordination Hospital/ED Discharge Follow up Note    Hospital/ED Discharge date: 8/22/21    Reason/Diagnosis for Hospital/ED visit: COVID PNA, SIRS, DKA,     Are you feeling better, the same, or worse since your Hospital/ED visit? better    Symptoms:     Cough - No    Shortness of breath:  no shortness of breath     Chest pain:  No    Fever: No    Headache:  No    Sore throat:  No    Nasal congestion:  No    Nausea/vomiting/diarrhea:  Yes. diarrhea    Body aches/joint pains:  No    Fatigue:  Yes    Pulse Oximeter/Oxygen Questions:    Were you sent home with a pulse oximeter?  Yes    Are you currently utilizing the pulse oximeter?  Yes    Do you understand how to use the home oximeter?  Yes    What are your current oxygen saturation levels?  91% RA    Oxygen saturation levels in ED/IP:  90-93%    Were you sent home with home oxygen?  Yes    Are you currently utilizing the oxygen?  Yes  PRN and at HS  What liter flow were you instructed to use?   1L cont  What liter flow are you using to maintain your oxygen saturation:   1.5L at HS, 2L w/ activity. Not using O2 at rest    Who is your supply company?  [employer] [facility] [facility] [employer]      Medications:    Were you prescribed any new medications?  Yes  Xarelto daily, Lantus 30 units daily, decreased metformin 50 mg BID  If yes, have you picked up these new medications?  Yes    Are the medications helping?  Yes    Do you have any questions about your new medications?  No       Follow Up:    Do you have a follow up appointment scheduled with your PCP or specialist?  Yes  8/26  Do you have a plan in place in the event of an emergency?  Yes    If patient is established or planning to establish primary care within [facility], Care Coordination was offered. Care Coordination accepted/declined:  Yes, declined    GetWell Loop invitation received?  Yes    GetWell Loop invitation accepted, pending patient activation or declined:  pending and encouarged  Instructed pt to drink  extra fluids to maintain hydration and to use IS hourly w/a.      Dunia Moss RN  [facility]

## 2021-08-24 NOTE — TELEPHONE ENCOUNTER
Looks like this was changed to 500 mg BID at hospital discharge, please advise what dose he should be on, has appointment with you 8/26/21

## 2021-08-24 NOTE — PROGRESS NOTES
A: DM2 uncontrolled       covid infection, recovering    P: long talk on metformin, insulin, diabetes.  He is willing to retry.  Stay on lantus 30units/day for now.  Increase prn.      2 weeks starting on 8/30 of light duty at work, then re-eval.  Note at  for him to .      F/u in a few months in clinic for DM2 check, labs, statin/asa discussion, insulin adjustments, etc.     15 min phone call          S :Marvin Horvath is a 60 year old male with phone visit.    covid infection: home now, on some oxygen, trying to wean off.  Ready to go back to work, but not ready for physical activity yet.  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  1L oxygen, at rest doesn't need any    Diabetes 2: 12.8 A1C.  Was on erratic metformin use and glipizide.  Now on 30 units lantus daily, off metformin.  Willing to get back on metformin.  Long acting due to GI issues?     O:There were no vitals taken for this visit.  Alert, interactive on phone.  No distress.  No cough or sob

## 2021-08-26 ENCOUNTER — VIRTUAL VISIT (OUTPATIENT)
Dept: FAMILY MEDICINE | Facility: CLINIC | Age: 61
End: 2021-08-26
Payer: COMMERCIAL

## 2021-08-26 DIAGNOSIS — U07.1 INFECTION DUE TO 2019 NOVEL CORONAVIRUS: ICD-10-CM

## 2021-08-26 DIAGNOSIS — E11.65 UNCONTROLLED TYPE 2 DIABETES MELLITUS WITH HYPERGLYCEMIA (H): Primary | ICD-10-CM

## 2021-08-26 PROBLEM — R11.2 NAUSEA AND VOMITING: Status: RESOLVED | Noted: 2021-08-14 | Resolved: 2021-08-26

## 2021-08-26 PROBLEM — R65.10 SIRS (SYSTEMIC INFLAMMATORY RESPONSE SYNDROME) (H): Status: RESOLVED | Noted: 2021-08-15 | Resolved: 2021-08-26

## 2021-08-26 PROBLEM — R73.9 HYPERGLYCEMIA: Status: RESOLVED | Noted: 2021-08-14 | Resolved: 2021-08-26

## 2021-08-26 PROBLEM — J96.01 ACUTE RESPIRATORY FAILURE WITH HYPOXIA (H): Status: RESOLVED | Noted: 2021-08-15 | Resolved: 2021-08-26

## 2021-08-26 PROBLEM — R79.89 TROPONIN LEVEL ELEVATED: Status: RESOLVED | Noted: 2021-08-14 | Resolved: 2021-08-26

## 2021-08-26 PROBLEM — E86.0 DEHYDRATION: Status: RESOLVED | Noted: 2021-08-14 | Resolved: 2021-08-26

## 2021-08-26 PROBLEM — H66.92 ACUTE OTITIS MEDIA OF LEFT EAR WITH PERFORATED TYMPANIC MEMBRANE: Status: RESOLVED | Noted: 2021-08-15 | Resolved: 2021-08-26

## 2021-08-26 PROBLEM — J12.82 PNEUMONIA DUE TO 2019 NOVEL CORONAVIRUS: Status: RESOLVED | Noted: 2021-08-15 | Resolved: 2021-08-26

## 2021-08-26 PROBLEM — E87.6 HYPOKALEMIA: Status: RESOLVED | Noted: 2021-08-16 | Resolved: 2021-08-26

## 2021-08-26 PROBLEM — H72.92 ACUTE OTITIS MEDIA OF LEFT EAR WITH PERFORATED TYMPANIC MEMBRANE: Status: RESOLVED | Noted: 2021-08-15 | Resolved: 2021-08-26

## 2021-08-26 PROCEDURE — 99214 OFFICE O/P EST MOD 30 MIN: CPT | Mod: TEL | Performed by: FAMILY MEDICINE

## 2021-08-26 RX ORDER — METFORMIN HYDROCHLORIDE 750 MG/1
750 TABLET, EXTENDED RELEASE ORAL 2 TIMES DAILY WITH MEALS
Qty: 180 TABLET | Refills: 3 | Status: SHIPPED | OUTPATIENT
Start: 2021-08-26 | End: 2022-08-24

## 2021-08-26 NOTE — LETTER
[facility]  [address] [place] [id number]  Phone: [phone]  Fax: [phone]      August 26, 2021      RE: Marvin Horvath  [address] [facility] [address]        To whom it may concern:    Marvin Horvath is under my professional care. He is recovering from covid infection, needed hospitalization.  He is out of quarantine, but still regaining strength.      He can return to work on 8/30/21, but should not do lifting or physical activity while he recovers.  We can re-evaluate these restrictions after 2 weeks of those restrictions.      Sincerely,    Zachary Andrews MD

## 2021-09-14 DIAGNOSIS — U07.1 PNEUMONIA DUE TO 2019 NOVEL CORONAVIRUS: ICD-10-CM

## 2021-09-14 DIAGNOSIS — J12.82 PNEUMONIA DUE TO 2019 NOVEL CORONAVIRUS: ICD-10-CM

## 2021-09-20 DIAGNOSIS — U07.1 PNEUMONIA DUE TO 2019 NOVEL CORONAVIRUS: ICD-10-CM

## 2021-09-20 DIAGNOSIS — J12.82 PNEUMONIA DUE TO 2019 NOVEL CORONAVIRUS: ICD-10-CM

## 2021-09-22 NOTE — TELEPHONE ENCOUNTER
Was only for  A month post covid.  No need for refills.  Please let everyone know.  This is second request for this that he doesn't need anymore

## 2021-09-25 ENCOUNTER — HEALTH MAINTENANCE LETTER (OUTPATIENT)
Age: 61
End: 2021-09-25

## 2021-09-27 ENCOUNTER — TELEPHONE (OUTPATIENT)
Dept: FAMILY MEDICINE | Facility: CLINIC | Age: 61
End: 2021-09-27

## 2021-09-27 NOTE — TELEPHONE ENCOUNTER
I called pharmacy staff and told them to cancel the Xarelto RX, not needed.  Machelle Garibay RN

## 2021-11-20 ENCOUNTER — HEALTH MAINTENANCE LETTER (OUTPATIENT)
Age: 61
End: 2021-11-20

## 2021-11-23 ENCOUNTER — TELEPHONE (OUTPATIENT)
Dept: FAMILY MEDICINE | Facility: CLINIC | Age: 61
End: 2021-11-23
Payer: COMMERCIAL

## 2021-11-23 DIAGNOSIS — E11.40 TYPE 2 DIABETES MELLITUS WITH DIABETIC NEUROPATHY, WITHOUT LONG-TERM CURRENT USE OF INSULIN (H): Primary | ICD-10-CM

## 2022-01-01 ENCOUNTER — HEALTH MAINTENANCE LETTER (OUTPATIENT)
Age: 62
End: 2022-01-01

## 2022-01-01 NOTE — TELEPHONE ENCOUNTER
Reason for Call:  Other     Detailed comments: Patient had his appendix out and is still having pain every once in a while - He does not want to go back to [facility] its out off net work.    Phone Number Patient can be reached at: Home number on file [phone] (home)    Best Time:     Can we leave a detailed message on this number? YES    Call taken on 4/20/2020 at 12:42 PM by Rachele Fernandez       None

## 2022-01-05 ENCOUNTER — TELEPHONE (OUTPATIENT)
Dept: FAMILY MEDICINE | Facility: CLINIC | Age: 62
End: 2022-01-05
Payer: COMMERCIAL

## 2022-01-05 NOTE — TELEPHONE ENCOUNTER
Patient Quality Outreach    Patient is due for the following:   Diabetes -  A1C and Diabetic Follow-Up Visit    NEXT STEPS:   Schedule a office visit for diabetes     Type of outreach:    Phone, left message for patient/parent to call back.      Questions for provider review:    None     Lorin Martinez MA

## 2022-02-17 PROBLEM — K21.9 GASTROESOPHAGEAL REFLUX DISEASE: Status: ACTIVE | Noted: 2019-11-13

## 2022-02-17 PROBLEM — E11.10 DKA (DIABETIC KETOACIDOSIS) (H): Status: RESOLVED | Noted: 2021-08-15 | Resolved: 2021-08-26

## 2022-03-12 ENCOUNTER — HEALTH MAINTENANCE LETTER (OUTPATIENT)
Age: 62
End: 2022-03-12

## 2022-04-07 DIAGNOSIS — E11.40 TYPE 2 DIABETES MELLITUS WITH DIABETIC NEUROPATHY, WITHOUT LONG-TERM CURRENT USE OF INSULIN (H): Primary | ICD-10-CM

## 2022-04-11 RX ORDER — INSULIN GLARGINE-YFGN 100 [IU]/ML
INJECTION, SOLUTION SUBCUTANEOUS
Qty: 15 ML | Refills: 1 | Status: SHIPPED | OUTPATIENT
Start: 2022-04-11 | End: 2022-06-20

## 2022-05-07 ENCOUNTER — HEALTH MAINTENANCE LETTER (OUTPATIENT)
Age: 62
End: 2022-05-07

## 2022-06-19 DIAGNOSIS — E11.40 TYPE 2 DIABETES MELLITUS WITH DIABETIC NEUROPATHY, WITHOUT LONG-TERM CURRENT USE OF INSULIN (H): ICD-10-CM

## 2022-06-20 ENCOUNTER — TELEPHONE (OUTPATIENT)
Dept: FAMILY MEDICINE | Facility: CLINIC | Age: 62
End: 2022-06-20
Payer: COMMERCIAL

## 2022-06-20 RX ORDER — INSULIN GLARGINE-YFGN 100 [IU]/ML
INJECTION, SOLUTION SUBCUTANEOUS
Qty: 15 ML | Refills: 1 | Status: SHIPPED | OUTPATIENT
Start: 2022-06-20 | End: 2022-08-24

## 2022-06-20 NOTE — TELEPHONE ENCOUNTER
Patient Quality Outreach    Patient is due for the following:   Diabetes -  Diabetic Follow-Up Visit    NEXT STEPS:   Schedule a office visit for diabetes    Type of outreach:    Phone, left message for patient/parent to call back.      Questions for provider review:    None     Lorin Martinez MA

## 2022-06-22 ENCOUNTER — TRANSFERRED RECORDS (OUTPATIENT)
Dept: FAMILY MEDICINE | Facility: CLINIC | Age: 62
End: 2022-06-22

## 2022-06-22 LAB — RETINOPATHY: NEGATIVE

## 2022-07-02 ENCOUNTER — HEALTH MAINTENANCE LETTER (OUTPATIENT)
Age: 62
End: 2022-07-02

## 2022-08-01 ENCOUNTER — TELEPHONE (OUTPATIENT)
Dept: FAMILY MEDICINE | Facility: CLINIC | Age: 62
End: 2022-08-01

## 2022-08-01 ENCOUNTER — APPOINTMENT (OUTPATIENT)
Dept: OCCUPATIONAL MEDICINE | Facility: CLINIC | Age: 62
End: 2022-08-01

## 2022-08-01 PROCEDURE — 86580 TB INTRADERMAL TEST: CPT | Performed by: FAMILY MEDICINE

## 2022-08-01 NOTE — TELEPHONE ENCOUNTER
Patient Quality Outreach    Patient is due for the following:   Diabetes -  Diabetic Follow-Up Visit    NEXT STEPS:   Schedule a office visit for diabetes'    Type of outreach:    Phone, spoke to patient/parent. new insurance patient will not be established at [place] now       Questions for provider review:    None     Lorin Martinez MA

## 2022-08-24 DIAGNOSIS — E11.65 UNCONTROLLED TYPE 2 DIABETES MELLITUS WITH HYPERGLYCEMIA (H): ICD-10-CM

## 2022-08-24 DIAGNOSIS — E11.40 TYPE 2 DIABETES MELLITUS WITH DIABETIC NEUROPATHY, WITHOUT LONG-TERM CURRENT USE OF INSULIN (H): ICD-10-CM

## 2022-08-24 RX ORDER — METFORMIN HYDROCHLORIDE 750 MG/1
TABLET, EXTENDED RELEASE ORAL
Qty: 60 TABLET | Refills: 0 | Status: SHIPPED | OUTPATIENT
Start: 2022-08-24 | End: 2022-09-23

## 2022-08-24 RX ORDER — INSULIN GLARGINE-YFGN 100 [IU]/ML
INJECTION, SOLUTION SUBCUTANEOUS
Qty: 5 ML | Refills: 0 | Status: SHIPPED | OUTPATIENT
Start: 2022-08-24

## 2022-09-22 DIAGNOSIS — E11.65 UNCONTROLLED TYPE 2 DIABETES MELLITUS WITH HYPERGLYCEMIA (H): ICD-10-CM

## 2022-09-23 RX ORDER — METFORMIN HYDROCHLORIDE 750 MG/1
TABLET, EXTENDED RELEASE ORAL
Qty: 60 TABLET | Refills: 0 | Status: SHIPPED | OUTPATIENT
Start: 2022-09-23

## 2022-09-23 NOTE — TELEPHONE ENCOUNTER
Sent short additional refill, sent my chart to notify patient needs appointment and labs for future refills.      EDGAR Cedeno

## 2022-10-09 ENCOUNTER — TELEPHONE (OUTPATIENT)
Dept: FAMILY MEDICINE | Facility: CLINIC | Age: 62
End: 2022-10-09

## 2022-10-09 DIAGNOSIS — E11.40 TYPE 2 DIABETES MELLITUS WITH DIABETIC NEUROPATHY, WITHOUT LONG-TERM CURRENT USE OF INSULIN (H): ICD-10-CM

## 2022-10-10 RX ORDER — INSULIN GLARGINE-YFGN 100 [IU]/ML
INJECTION, SOLUTION SUBCUTANEOUS
Qty: 15 ML | Refills: 0 | OUTPATIENT
Start: 2022-10-10

## 2022-10-11 NOTE — TELEPHONE ENCOUNTER
Message was sent by My Chart 9/23/22  [facility]  Daniela [place] Sec

## 2023-01-01 ENCOUNTER — HEALTH MAINTENANCE LETTER (OUTPATIENT)
Age: 63
End: 2023-01-01